# Patient Record
Sex: FEMALE | Race: WHITE | NOT HISPANIC OR LATINO | Employment: OTHER | URBAN - METROPOLITAN AREA
[De-identification: names, ages, dates, MRNs, and addresses within clinical notes are randomized per-mention and may not be internally consistent; named-entity substitution may affect disease eponyms.]

---

## 2017-12-12 ENCOUNTER — TRANSFERRED RECORDS (OUTPATIENT)
Dept: HEALTH INFORMATION MANAGEMENT | Facility: CLINIC | Age: 82
End: 2017-12-12

## 2018-04-06 ENCOUNTER — TRANSFERRED RECORDS (OUTPATIENT)
Dept: HEALTH INFORMATION MANAGEMENT | Facility: CLINIC | Age: 83
End: 2018-04-06

## 2018-04-10 ENCOUNTER — TRANSFERRED RECORDS (OUTPATIENT)
Dept: HEALTH INFORMATION MANAGEMENT | Facility: CLINIC | Age: 83
End: 2018-04-10

## 2018-07-30 ENCOUNTER — TRANSFERRED RECORDS (OUTPATIENT)
Dept: HEALTH INFORMATION MANAGEMENT | Facility: CLINIC | Age: 83
End: 2018-07-30

## 2018-09-13 ENCOUNTER — PRE VISIT (OUTPATIENT)
Dept: ORTHOPEDICS | Facility: CLINIC | Age: 83
End: 2018-09-13

## 2018-09-13 NOTE — TELEPHONE ENCOUNTER
RECORDS RECEIVED FROM: [facility]   DATE RECEIVED: 09/13/2018   NOTES STATUS DETAILS   OFFICE NOTE from referring provider Received    OFFICE NOTE from other specialist Received    DISCHARGE SUMMARY from hospital N/A    DISCHARGE REPORT from the ER N/A    OPERATIVE REPORT N/A    MEDICATION LIST In process    IMPLANT RECORD/STICKER N/A    LABS     CBC/DIFF In process    CULTURES N/A    INJECTIONS DONE IN RADIOLOGY In process    MRI Received    CT SCAN Received    XRAYS (IMAGES & REPORTS) Received    TUMOR     PATHOLOGY  Slides & report N/A

## 2018-11-26 ASSESSMENT — ENCOUNTER SYMPTOMS
NECK MASS: 0
JAUNDICE: 0
CONSTIPATION: 0
STIFFNESS: 0
SINUS PAIN: 0
POLYDIPSIA: 0
NAUSEA: 0
FATIGUE: 1
BLOOD IN STOOL: 0
HEADACHES: 0
ABDOMINAL PAIN: 0
NUMBNESS: 0
INCREASED ENERGY: 1
JOINT SWELLING: 0
TINGLING: 0
NECK PAIN: 0
HALLUCINATIONS: 0
WEAKNESS: 0
MYALGIAS: 0
RECTAL PAIN: 0
LOSS OF CONSCIOUSNESS: 0
SMELL DISTURBANCE: 0
TASTE DISTURBANCE: 0
DECREASED APPETITE: 1
BLOATING: 0
FEVER: 0
PARALYSIS: 0
SORE THROAT: 1
MEMORY LOSS: 0
TREMORS: 0
ARTHRALGIAS: 0
CHILLS: 0
MUSCLE WEAKNESS: 0
DISTURBANCES IN COORDINATION: 0
HOARSE VOICE: 0
BACK PAIN: 1
DIZZINESS: 0
BOWEL INCONTINENCE: 1
WEIGHT LOSS: 1
POLYPHAGIA: 0
NIGHT SWEATS: 0
SINUS CONGESTION: 0
SEIZURES: 0
WEIGHT GAIN: 0
SPEECH CHANGE: 0
MUSCLE CRAMPS: 0
TROUBLE SWALLOWING: 0
DIARRHEA: 1
HEARTBURN: 0
VOMITING: 0
ALTERED TEMPERATURE REGULATION: 0

## 2018-11-28 DIAGNOSIS — M54.50 LOW BACK PAIN: Primary | ICD-10-CM

## 2018-11-28 NOTE — PROGRESS NOTES
[facility]  Orthopedics  Johnathan Llamas MD  2018     Name: Avani Be  MRN: [id number]  Age: 86 year old  : 1931  Referring provider: Jorge Marcelo     Chief Complaint: Pain and Consult For of the Lower Back and Consult (possible loose screw and lumbar fx)     Date of Injury: 10/20/18    History of Present Illness:   Avani Be is a 86 year old female who presents to clinic today for an evaluation of low back pain.  She has a history of lumbar spinal fusion L4-5, which was performed in  by Dr. Sapp at [facility].  When she originally made this appointment, she was referred by Dr. Jorge Marcelo for a second opinion regarding a loose screw in her fusion construct.  However, about one month ago, she fell down one step and sustained an L2 compression fracture.  Her main complaint today is low back pain at this level.  Prior to the fall, her main complaint was an inability to stand for prolonged periods of time.  Specifically, she couldn't stand long enough to sing the hymns in [place].  She is currently taking Tylenol #3 for pain control.  She is able to sleep through the night.  She denies changes in bowel and bladder habits.  She denies taking any medications for osteoporosis.      She denies major medical problems including diabetes, heart disease, history of stroke, history of cancer, history of blood clot, kidney disease, and liver disease.  She is not taking any blood thinners other than a baby aspirin.    Review of Systems:   A 10-point review of systems was obtained and is negative except for as noted in the HPI.     Medications:     Current Outpatient Prescriptions:      acetaminophen (TYLENOL) 325 MG tablet, Take 2 tablets (650 mg) by mouth every 6 hours as needed for mild pain, Disp: 100 tablet, Rfl:      albuterol (2.5 MG/3ML) 0.083% nebulizer solution, Take 1 vial by nebulization every 6 hours as needed for shortness of breath / dyspnea or wheezing, Disp: , Rfl:       ALENDRONATE SODIUM PO, Take 35 mg by mouth once a week, Disp: , Rfl:      ASPIRIN PO, Take 81 mg by mouth daily, Disp: , Rfl:      Calcium Carbonate (CALTRATE 600 PO), , Disp: , Rfl:      fluticasone-salmeterol (ADVAIR) 500-50 MCG/DOSE diskus inhaler, Inhale 1 puff into the lungs 2 times daily, Disp: , Rfl:      GABAPENTIN PO, Take 300 mg by mouth 2 times daily, Disp: , Rfl:      METOPROLOL TARTRATE PO, Take 50 mg by mouth 2 times daily , Disp: , Rfl:      miconazole (MICATIN; MICRO GUARD) 2 % powder, Apply topically daily Use until March 5, Disp: , Rfl:      Multiple Vitamin (MULTIVITAMINS PO), Take 1 tablet by mouth Pt takes Centrum  silver, Disp: , Rfl:      omeprazole (PRILOSEC) 20 MG capsule, Take 20 mg by mouth daily, Disp: , Rfl:      senna-docusate (SENOKOT-S;PERICOLACE) 8.6-50 MG per tablet, Take 2 tablets by mouth 2 times daily as needed for constipation, Disp: 7 tablet, Rfl:      SIMVASTATIN PO, Take 40 mg by mouth At Bedtime, Disp: , Rfl:      traMADol (ULTRAM) 50 MG tablet, Take 0.5-1 tablets (25-50 mg) by mouth every 6 hours as needed for other (pain), Disp: 50 tablet, Rfl: 0     VITAMIN D, CHOLECALCIFEROL, PO, Take 2,000 Units by mouth daily, Disp: , Rfl:     Allergies:  Allergies   Allergen Reactions     Hydrocodone      Hydrocodon Bitartrate powder     Oxycodone Hcl [Oxycodone]        Past Medical History:  Past Medical History:   Diagnosis Date     Abdominal pain, unspecified site      Acquired keratoderma      Actinic keratosis      Acute sinusitis, unspecified      Acute upper respiratory infections of unspecified site      Arthritis      Benign neoplasm of colon      Benign neoplasm of skin, site unspecified      Cervicalgia      Closed dislocation of metatarsal (bone), joint unspecified      Cough      Diffuse cystic mastopathy      Dizziness and giddiness      Essential hypertension, benign      Fever, unspecified      GERD (gastroesophageal reflux disease)      Headache(784.0)       "Injury, other and unspecified, knee, leg, ankle, and foot      Mixed hyperlipidemia      Mononeuritis of unspecified site      Nonsuppurative otitis media, not specified as acute or chronic      Osteoporosis, unspecified      Other emphysema (H)      Other musculoskeletal symptoms referable to limbs(729.89)      Other seborrheic keratosis      Other specified personal history presenting hazards to health(V15.89)      Other specified personal history presenting hazards to health(V15.89)      Pain in limb      Plantar wart      Postnasal drip      Radiculopathy of lumbar region      Tachycardia, unspecified      Unspecified asthma(493.90)      Unspecified asthma(493.90)      Unspecified disorder of skin and subcutaneous tissue      Unspecified sinusitis (chronic)      Urinary tract infection, site not specified        Past Surgical History:  Past Surgical History:   Procedure Laterality Date     OPTICAL TRACKING SYSTEM FUSION POSTERIOR SPINE LUMBAR N/A 2/23/2015    Procedure: OPTICAL TRACKING SYSTEM FUSION SPINE POSTERIOR LUMBAR ONE LEVEL;  Surgeon: Issa Sapp MD;  Location:  OR     ORTHOPEDIC SURGERY Left     Left knee replacement   Cholecystectomy  Stomach stapling in 1980's     Social History:  Patient lives alone in an apartment. She denies tobacco use.    Social History     Social History     Marital status:      Spouse name: N/A     Number of children: N/A     Years of education: N/A     Social History Main Topics     Smoking status: Former Smoker     Smokeless tobacco: Not on file     Alcohol use Yes     Drug use: Not on file     Sexual activity: Not on file     Other Topics Concern     Not on file     Social History Narrative       Family History:  Non-contributory    Physical Examination:  Height 1.499 m (4' 11\"), weight 76.1 kg (167 lb 11.2 oz).   Constitutional - Patient is healthy, well-nourished and appears stated age.   Respiratory - Patient is breathing normally and in no respiratory " distress.   Skin - No suspicious rashes or lesions.   Psychiatric - Normal mood and affect.   Cardiovascular - Extremities warm and well perfused.   Eyes - Visual acuity is normal to the written word.   ENT - Hearing intact to the spoken word.   GI - No abdominal distention.   Musculoskeletal - Antalgic gait with the use of a walker.            Lumbar Spine:    Appearance - Stooped forward posture    Palpation - Tender to palpation in the midline    ROM - Deferred    Motor -        LOWER EXTREMITY Left Right   Hip flexion 5/5 5/5   Knee flexion 5/5 5/5   Knee extension 5/5 5/5   Ankle dorsiflexion 5/5 5/5   Ankle plantarflexion 5/5 5/5   Great toe extension 5/5 5/5      Neurologic - Sensation intact to light touch bilaterally in the lower extremities.      Imaging:   Radiographs of the full spine - two views (11/29/2018)  Instrumented levels include L4-5.  There is a compression deformity at L2.    A CT of the lumbar spine was reviewed today.  It shows halo formation around the right L5 pedicle screw.  There is gas present in the interspace of L4-L5.    I have independently reviewed the above imaging studies; the results were discussed with the patient.       Assessment:   1. H/O lumbar spinal fusion  2. Pseudarthrosis secondary to #1  3. L2 osteoporotic compression fracture    Plan:   Avani is an 86 year old female who presented to clinic today for an evaluation of low back pain.  She was originally referred for loose instrumentation around her previous lumbar spinal fusion, but her biggest complaint now is low back pain related to her compression fracture.  We discussed her imaging in detail, which does in fact show halo formatio around the right L5 screw, as well as a vacuum disc at L4-5.  We can address that problem in the future, if it significantly impacts her life.  However, right now we should address the pain associated with the compression fracture.  We discussed kyphoplasty in detail.  There is a time  frame in which to complete a kyphoplasty, which is ideally within six weeks of the injury but no more than three months.  We may be able to get her added onto the schedule for next week, but she will need a PAC evaluation with history and physical exam.      We reviewed the risks and benefits of the surgery in detail. The risks include those associated with anesthesia, including death, pulmonary embolism, DVT, stroke, myocardial infarction, pneumonia, blindness, and urinary tract infection. Additional risks include the risk of blood loss, infection, nerve damage, cement extravasation, and failure of the intervention to improve her symptoms.  She understands the risks of the surgery and wishes to proceed. All other questions were answered today.    Alondra Sr PA-C ........................ 11/29/2018, 2:34 PM    I saw and evaluated the patient and developed the plan.        Answers for HPI/ROS submitted by the patient on 11/26/2018   General Symptoms: Yes  Skin Symptoms: No  HENT Symptoms: Yes  EYE SYMPTOMS: No  HEART SYMPTOMS: No  LUNG SYMPTOMS: No  INTESTINAL SYMPTOMS: Yes  URINARY SYMPTOMS: No  GYNECOLOGIC SYMPTOMS: No  BREAST SYMPTOMS: No  SKELETAL SYMPTOMS: Yes  BLOOD SYMPTOMS: No  NERVOUS SYSTEM SYMPTOMS: Yes  MENTAL HEALTH SYMPTOMS: No  Fever: No  Loss of appetite: Yes  Weight loss: Yes  Weight gain: No  Fatigue: Yes  Night sweats: No  Chills: No  Increased stress: Yes  Excessive hunger: No  Excessive thirst: No  Feeling hot or cold when others believe the temperature is normal: No  Loss of height: No  Post-operative complications: No  Surgical site pain: No  Hallucinations: No  Change in or Loss of Energy: Yes  Hyperactivity: No  Confusion: No  Ear pain: No  Ear discharge: No  Hearing loss: Yes  Tinnitus: Yes  Nosebleeds: No  Congestion: No  Sinus pain: No  Trouble swallowing: No   Voice hoarseness: No  Mouth sores: No  Sore throat: Yes  Tooth pain: No  Gum tenderness: No  Bleeding gums: No  Change in  taste: No  Change in sense of smell: No  Dry mouth: No  Hearing aid used: No  Neck lump: No  Heart burn or indigestion: No  Nausea: No  Vomiting: No  Abdominal pain: No  Bloating: No  Constipation: No  Diarrhea: Yes  Blood in stool: No  Black stools: No  Rectal or Anal pain: No  Fecal incontinence: Yes  Yellowing of skin or eyes: No  Vomit with blood: No  Change in stools: No  Back pain: Yes  Muscle aches: No  Neck pain: No  Swollen joints: No  Joint pain: No  Bone pain: Yes  Muscle cramps: No  Muscle weakness: No  Joint stiffness: No  Bone fracture: Yes  Trouble with coordination: No  Dizziness or trouble with balance: No  Fainting or black-out spells: No  Memory loss: No  Headache: No  Seizures: No  Speech problems: No  Tingling: No  Tremor: No  Weakness: No  Difficulty walking: Yes  Paralysis: No  Numbness: No

## 2018-11-29 ENCOUNTER — RADIANT APPOINTMENT (OUTPATIENT)
Dept: GENERAL RADIOLOGY | Facility: CLINIC | Age: 83
End: 2018-11-29
Attending: ORTHOPAEDIC SURGERY
Payer: MEDICARE

## 2018-11-29 ENCOUNTER — OFFICE VISIT (OUTPATIENT)
Dept: ORTHOPEDICS | Facility: CLINIC | Age: 83
End: 2018-11-29
Payer: MEDICARE

## 2018-11-29 VITALS — WEIGHT: 167.7 LBS | BODY MASS INDEX: 33.81 KG/M2 | HEIGHT: 59 IN

## 2018-11-29 DIAGNOSIS — M80.88XD OSTEOPOROTIC COMPRESSION FRACTURE OF SPINE, WITH ROUTINE HEALING, SUBSEQUENT ENCOUNTER: Primary | ICD-10-CM

## 2018-11-29 DIAGNOSIS — M96.0 PSEUDARTHROSIS FOLLOWING SPINAL FUSION: ICD-10-CM

## 2018-11-29 DIAGNOSIS — M54.50 LOW BACK PAIN: ICD-10-CM

## 2018-11-29 DIAGNOSIS — Z98.1 H/O SPINAL FUSION: ICD-10-CM

## 2018-11-29 ASSESSMENT — PATIENT HEALTH QUESTIONNAIRE - PHQ9
SUM OF ALL RESPONSES TO PHQ QUESTIONS 1-9: 10
10. IF YOU CHECKED OFF ANY PROBLEMS, HOW DIFFICULT HAVE THESE PROBLEMS MADE IT FOR YOU TO DO YOUR WORK, TAKE CARE OF THINGS AT HOME, OR GET ALONG WITH OTHER PEOPLE: EXTREMELY DIFFICULT
SUM OF ALL RESPONSES TO PHQ QUESTIONS 1-9: 10

## 2018-11-29 NOTE — NURSING NOTE
"Reason For Visit:   Chief Complaint   Patient presents with     Lower Back - Pain, Consult For     Consult     possible loose screw and lumbar fx       Primary MD: Estevan Valdovinos  Ref. MD: Jorge Marcelo    ?  No  Work status?  Retired.  Date of injury: 10/20/18    Type of injury: Originally chronic condition but fell and fx lumbar vertebrae.  Date of surgery: 02/23/15  Type of surgery: REVISION L4-5 LAMINECTOMY; L4-5 POSTERIOR LUMBAR STEALTH INTERBODY FUSION ; AUTOGRAFT AND ALLOGRAFT  Smoker: No  Request smoking cessation information: No    Ht 1.499 m (4' 11\")  Wt 76.1 kg (167 lb 11.2 oz)  BMI 33.87 kg/m2    Pain Assessment  Patient Currently in Pain: Yes  0-10 Pain Scale: 8  Primary Pain Location: Back  Pain Orientation: Lower  Pain Descriptors: Shooting, Intermittent  Alleviating Factors: Pain medication, Other (comment) (tylenol 3 with codiene helps relax back)  Aggravating Factors: Sitting, Walking, Standing, Other (comment) (standing is the worst, riding in the car)    Oswestry (LOUIE) Questionnaire    OSWESTRY DISABILITY INDEX 11/26/2018   Count 9   Sum 30   Oswestry Score (%) 66.67   Some recent data might be hidden            Neck Disability Index (NDI) Questionnaire    No flowsheet data found.           Visual Analog Pain Scale  Back Pain Scale 0-10: 8.5  Right leg pain: 0  Left leg pain: 0  Neck Pain Scale 0-10: 0    Promis 10 Assessment    PROMIS 10 11/26/2018   In general, would you say your health is: Very good   In general, would you say your quality of life is: Very good   In general, how would you rate your physical health? Fair   In general, how would you rate your mental health, including your mood and your ability to think? Very good   In general, how would you rate your satisfaction with your social activities and relationships? Very good   In general, please rate how well you carry out your usual social activities and roles Fair   To what extent are you able to carry out your " everyday physical activities such as walking, climbing stairs, carrying groceries, or moving a chair? A little   How often have you been bothered by emotional problems such as feeling anxious, depressed or irritable? Rarely   How would you rate your fatigue on average? None   How would you rate your pain on average?   0 = No Pain  to  10 = Worst Imaginable Pain 8   In general, would you say your health is: 4   In general, would you say your quality of life is: 4   In general, how would you rate your physical health? 2   In general, how would you rate your mental health, including your mood and your ability to think? 4   In general, how would you rate your satisfaction with your social activities and relationships? 4   In general, please rate how well you carry out your usual social activities and roles. (This includes activities at home, at work and in your community, and responsibilities as a parent, child, spouse, employee, friend, etc.) 2   To what extent are you able to carry out your everyday physical activities such as walking, climbing stairs, carrying groceries, or moving a chair? 2   In the past 7 days, how often have you been bothered by emotional problems such as feeling anxious, depressed, or irritable? 2   In the past 7 days, how would you rate your fatigue on average? 1   In the past 7 days, how would you rate your pain on average, where 0 means no pain, and 10 means worst imaginable pain? 8   Global Mental Health Score 16   Global Physical Health Score 11   PROMIS TOTAL - SUBSCORES 27   Some recent data might be hidden                Stefanie Braswell, ATC

## 2018-11-29 NOTE — LETTER
2018       RE: Avani Be  [address]  [facility] [place] [facility] [place] [address]     Dear Colleague,    Thank you for referring your patient, Avani Be, to the [facility] at [facility]. Please see a copy of my visit note below.    [facility]  Orthopedics  Johnathan Llamas MD  2018     Name: Avani Be  MRN: [id number]  Age: 86 year old  : 1931  Referring provider: Jorge Marcelo     Chief Complaint: Pain and Consult For of the Lower Back and Consult (possible loose screw and lumbar fx)     Date of Injury: 10/20/18    History of Present Illness:   Avani Be is a 86 year old female who presents to clinic today for an evaluation of low back pain.  She has a history of lumbar spinal fusion L4-5, which was performed in  by Dr. Sapp at [facility].  When she originally made this appointment, she was referred by Dr. Jorge Marcelo for a second opinion regarding a loose screw in her fusion construct.  However, about one month ago, she fell down one step and sustained an L2 compression fracture.  Her main complaint today is low back pain at this level.  Prior to the fall, her main complaint was an inability to stand for prolonged periods of time.  Specifically, she couldn't stand long enough to sing the hymns in [place].  She is currently taking Tylenol #3 for pain control.  She is able to sleep through the night.  She denies changes in bowel and bladder habits.  She denies taking any medications for osteoporosis.      She denies major medical problems including diabetes, heart disease, history of stroke, history of cancer, history of blood clot, kidney disease, and liver disease.  She is not taking any blood thinners other than a baby aspirin.    Review of Systems:   A 10-point review of systems was obtained and is negative except for as noted in the HPI.     Medications:     Current Outpatient Prescriptions:       acetaminophen (TYLENOL) 325 MG tablet, Take 2 tablets (650 mg) by mouth every 6 hours as needed for mild pain, Disp: 100 tablet, Rfl:      albuterol (2.5 MG/3ML) 0.083% nebulizer solution, Take 1 vial by nebulization every 6 hours as needed for shortness of breath / dyspnea or wheezing, Disp: , Rfl:      ALENDRONATE SODIUM PO, Take 35 mg by mouth once a week, Disp: , Rfl:      ASPIRIN PO, Take 81 mg by mouth daily, Disp: , Rfl:      Calcium Carbonate (CALTRATE 600 PO), , Disp: , Rfl:      fluticasone-salmeterol (ADVAIR) 500-50 MCG/DOSE diskus inhaler, Inhale 1 puff into the lungs 2 times daily, Disp: , Rfl:      GABAPENTIN PO, Take 300 mg by mouth 2 times daily, Disp: , Rfl:      METOPROLOL TARTRATE PO, Take 50 mg by mouth 2 times daily , Disp: , Rfl:      miconazole (MICATIN; MICRO GUARD) 2 % powder, Apply topically daily Use until March 5, Disp: , Rfl:      Multiple Vitamin (MULTIVITAMINS PO), Take 1 tablet by mouth Pt takes Centrum  silver, Disp: , Rfl:      omeprazole (PRILOSEC) 20 MG capsule, Take 20 mg by mouth daily, Disp: , Rfl:      senna-docusate (SENOKOT-S;PERICOLACE) 8.6-50 MG per tablet, Take 2 tablets by mouth 2 times daily as needed for constipation, Disp: 7 tablet, Rfl:      SIMVASTATIN PO, Take 40 mg by mouth At Bedtime, Disp: , Rfl:      traMADol (ULTRAM) 50 MG tablet, Take 0.5-1 tablets (25-50 mg) by mouth every 6 hours as needed for other (pain), Disp: 50 tablet, Rfl: 0     VITAMIN D, CHOLECALCIFEROL, PO, Take 2,000 Units by mouth daily, Disp: , Rfl:     Allergies:  Allergies   Allergen Reactions     Hydrocodone      Hydrocodon Bitartrate powder     Oxycodone Hcl [Oxycodone]        Past Medical History:  Past Medical History:   Diagnosis Date     Abdominal pain, unspecified site      Acquired keratoderma      Actinic keratosis      Acute sinusitis, unspecified      Acute upper respiratory infections of unspecified site      Arthritis      Benign neoplasm of colon      Benign neoplasm of skin,  site unspecified      Cervicalgia      Closed dislocation of metatarsal (bone), joint unspecified      Cough      Diffuse cystic mastopathy      Dizziness and giddiness      Essential hypertension, benign      Fever, unspecified      GERD (gastroesophageal reflux disease)      Headache(784.0)      Injury, other and unspecified, knee, leg, ankle, and foot      Mixed hyperlipidemia      Mononeuritis of unspecified site      Nonsuppurative otitis media, not specified as acute or chronic      Osteoporosis, unspecified      Other emphysema (H)      Other musculoskeletal symptoms referable to limbs(729.89)      Other seborrheic keratosis      Other specified personal history presenting hazards to health(V15.89)      Other specified personal history presenting hazards to health(V15.89)      Pain in limb      Plantar wart      Postnasal drip      Radiculopathy of lumbar region      Tachycardia, unspecified      Unspecified asthma(493.90)      Unspecified asthma(493.90)      Unspecified disorder of skin and subcutaneous tissue      Unspecified sinusitis (chronic)      Urinary tract infection, site not specified        Past Surgical History:  Past Surgical History:   Procedure Laterality Date     OPTICAL TRACKING SYSTEM FUSION POSTERIOR SPINE LUMBAR N/A 2/23/2015    Procedure: OPTICAL TRACKING SYSTEM FUSION SPINE POSTERIOR LUMBAR ONE LEVEL;  Surgeon: Issa Sapp MD;  Location:  OR     ORTHOPEDIC SURGERY Left     Left knee replacement   Cholecystectomy  Stomach stapling in 1980's     Social History:  Patient lives alone in an apartment. She denies tobacco use.    Social History     Social History     Marital status:      Spouse name: N/A     Number of children: N/A     Years of education: N/A     Social History Main Topics     Smoking status: Former Smoker     Smokeless tobacco: Not on file     Alcohol use Yes     Drug use: Not on file     Sexual activity: Not on file     Other Topics Concern     Not on file  "    Social History Narrative       Family History:  Non-contributory    Physical Examination:  Height 1.499 m (4' 11\"), weight 76.1 kg (167 lb 11.2 oz).   Constitutional - Patient is healthy, well-nourished and appears stated age.   Respiratory - Patient is breathing normally and in no respiratory distress.   Skin - No suspicious rashes or lesions.   Psychiatric - Normal mood and affect.   Cardiovascular - Extremities warm and well perfused.   Eyes - Visual acuity is normal to the written word.   ENT - Hearing intact to the spoken word.   GI - No abdominal distention.   Musculoskeletal - Antalgic gait with the use of a walker.            Lumbar Spine:    Appearance - Stooped forward posture    Palpation - Tender to palpation in the midline    ROM - Deferred    Motor -        LOWER EXTREMITY Left Right   Hip flexion 5/5 5/5   Knee flexion 5/5 5/5   Knee extension 5/5 5/5   Ankle dorsiflexion 5/5 5/5   Ankle plantarflexion 5/5 5/5   Great toe extension 5/5 5/5      Neurologic - Sensation intact to light touch bilaterally in the lower extremities.      Imaging:   Radiographs of the full spine - two views (11/29/2018)  Instrumented levels include L4-5.  There is a compression deformity at L2.    A CT of the lumbar spine was reviewed today.  It shows halo formation around the right L5 pedicle screw.  There is gas present in the interspace of L4-L5.    I have independently reviewed the above imaging studies; the results were discussed with the patient.       Assessment:   1. H/O lumbar spinal fusion  2. Pseudarthrosis secondary to #1  3. L2 osteoporotic compression fracture    Plan:   Avani is an 86 year old female who presented to clinic today for an evaluation of low back pain.  She was originally referred for loose instrumentation around her previous lumbar spinal fusion, but her biggest complaint now is low back pain related to her compression fracture.  We discussed her imaging in detail, which does in fact show halo " formatio around the right L5 screw, as well as a vacuum disc at L4-5.  We can address that problem in the future, if it significantly impacts her life.  However, right now we should address the pain associated with the compression fracture.  We discussed kyphoplasty in detail.  There is a time frame in which to complete a kyphoplasty, which is ideally within six weeks of the injury but no more than three months.  We may be able to get her added onto the schedule for next week, but she will need a PAC evaluation with history and physical exam.      We reviewed the risks and benefits of the surgery in detail. The risks include those associated with anesthesia, including death, pulmonary embolism, DVT, stroke, myocardial infarction, pneumonia, blindness, and urinary tract infection. Additional risks include the risk of blood loss, infection, nerve damage, cement extravasation, and failure of the intervention to improve her symptoms.  She understands the risks of the surgery and wishes to proceed. All other questions were answered today.    Alondra Sr PA-C ........................ 11/29/2018, 2:34 PM    I saw and evaluated the patient and developed the plan.    Again, thank you for allowing me to participate in the care of your patient.      Sincerely,    Johnathan Llamas MD

## 2018-11-29 NOTE — MR AVS SNAPSHOT
After Visit Summary   11/29/2018    Avani Be    MRN: [id number]           Patient Information     Date Of Birth          12/30/1931        Visit Information        Provider Department      11/29/2018 1:00 PM Johnathan Llamas MD [facility]        Today's Diagnoses     Osteoporotic compression fracture of spine, with routine healing, subsequent encounter    -  1    H/O spinal fusion        Pseudarthrosis following spinal fusion           Follow-ups after your visit        Additional Services     SPORTS MEDICINE REFERRAL       Your provider has referred you to:  Dr. Mccauley for bone density evaluation    Please be aware that coverage of these services is subject to the terms and limitations of your health insurance plan.  Call member services at your health plan with any benefit or coverage questions.      Please bring the following to your appointment:    >>   Any x-rays, CTs or MRIs which have been performed.  Contact the facility where they were done to arrange for  prior to your scheduled appointment.    >>   List of current medications   >>   This referral request   >>   Any documents/labs given to you for this referral                  Your next 10 appointments already scheduled     Dec 13, 2018  4:00 PM CST   (Arrive by 3:45 PM)   New Patient Visit with Bob Peraza MD   [facility] ([facility])    [address] [place] [address]  5th [facility] [address]   [phone]            Jan 23, 2019  9:30 AM CST   (Arrive by 9:00 AM)   RETURN SPINE with Johnathan Llamas MD   [facility] ([facility])    [address] [place] [address]   [phone]            Feb 27, 2019 10:45 AM CST   (Arrive by 10:15 AM)   RETURN SPINE with Johnathan Llamas MD   [facility] ([facility])    [address] [place] [address]  "[id number] [phone]              Who to contact     Please call your clinic at [phone] to:    Ask questions about your health    Make or cancel appointments    Discuss your medicines    Learn about your test results    Speak to your doctor            Additional Information About Your Visit        madKasthart Information     [employer] gives you secure access to your electronic health record. If you see a primary care provider, you can also send messages to your care team and make appointments. If you have questions, please call your primary care clinic.  If you do not have a primary care provider, please call [phone] and they will assist you.      [employer] is an electronic gateway that provides easy, online access to your medical records. With [employer], you can request a clinic appointment, read your test results, renew a prescription or communicate with your care team.     To access your existing account, please contact your [facility] or call [phone] for assistance.        Care EveryWhere ID     This is your Care EveryWhere ID. This could be used by other organizations to access your [facility] medical records  [id number]        Your Vitals Were     Height BMI (Body Mass Index)                1.499 m (4' 11\") 33.87 kg/m2           Blood Pressure from Last 3 Encounters:   02/26/15 128/63    Weight from Last 3 Encounters:   11/29/18 76.1 kg (167 lb 11.2 oz)   02/23/15 81 kg (178 lb 9.2 oz)              We Performed the Following     SPORTS MEDICINE REFERRAL        Primary Care Provider Office Phone # Fax #    Estevan Valdovinos [phone]       [facility] [address]  [facility] [address]        Equal Access to Services     [facility]: Hadii aad ku hadasho Somihaela, waaxda luqadaha, qaybta kaalkay martin. So [facility] [phone].    ATENCIÓN: Si habla español, tiene a ng disposición " servicios gratuitos de asistencia lingüística. Mel bowie [phone].    We comply with applicable federal civil rights laws and [place] laws. We do not discriminate on the basis of race, color, national origin, age, disability, sex, sexual orientation, or gender identity.            Thank you!     Thank you for choosing [facility]  for your care. Our goal is always to provide you with excellent care. Hearing back from our patients is one way we can continue to improve our services. Please take a few minutes to complete the written survey that you may receive in the mail after your visit with us. Thank you!             Your Updated Medication List - Protect others around you: Learn how to safely use, store and throw away your medicines at www.disposemymeds.org.          This list is accurate as of 11/29/18  2:43 PM.  Always use your most recent med list.                   Brand Name Dispense Instructions for use Diagnosis    acetaminophen 325 MG tablet    TYLENOL    100 tablet    Take 2 tablets (650 mg) by mouth every 6 hours as needed for mild pain    S/P lumbar spinal fusion       albuterol (2.5 MG/3ML) 0.083% neb solution    PROVENTIL     Take 1 vial by nebulization every 6 hours as needed for shortness of breath / dyspnea or wheezing        ALENDRONATE SODIUM PO      Take 35 mg by mouth once a week        ASPIRIN PO      Take 81 mg by mouth daily        CALTRATE 600 PO           fluticasone-salmeterol 500-50 MCG/DOSE inhaler    ADVAIR     Inhale 1 puff into the lungs 2 times daily        GABAPENTIN PO      Take 300 mg by mouth 2 times daily        METOPROLOL TARTRATE PO      Take 50 mg by mouth 2 times daily        miconazole 2 % external powder    MICATIN/MICRO GUARD     Apply topically daily Use until March 5    S/P lumbar spinal fusion       MULTIVITAMINS PO      Take 1 tablet by mouth Pt takes Centrum  silver        omeprazole 20 MG DR capsule    priLOSEC     Take 20 mg by mouth daily         senna-docusate 8.6-50 MG tablet    SENOKOT-S/PERICOLACE    7 tablet    Take 2 tablets by mouth 2 times daily as needed for constipation    S/P lumbar spinal fusion       SIMVASTATIN PO      Take 40 mg by mouth At Bedtime        traMADol 50 MG tablet    ULTRAM    50 tablet    Take 0.5-1 tablets (25-50 mg) by mouth every 6 hours as needed for other (pain)    S/P lumbar spinal fusion       VITAMIN D (CHOLECALCIFEROL) PO      Take 2,000 Units by mouth daily

## 2018-11-29 NOTE — NURSING NOTE
Teaching Flowsheet   Relevant Diagnosis: L2 FX  Teaching Topic: preop      Person(s) involved in teaching:   Patient, Daughter and son in law     Motivation Level:  Asks Questions: Yes  Eager to Learn: Yes  Cooperative: Yes  Receptive (willing/able to accept information): Yes  Any cultural factors/Protestant beliefs that may influence understanding or compliance? No       Patient and Family demonstrates understanding of the following:  Reason for the appointment, diagnosis and treatment plan: Yes  Knowledge of proper use of medications and conditions for which they are ordered (with special attention to potential side effects or drug interactions): Yes  Which situations necessitate calling provider and whom to contact: Yes       Teaching Concerns Addressed:        Proper use and care of meds. (medical equip, care aids, etc.): Yes  Nutritional needs and diet plan: Yes  Pain management techniques: Yes  Wound Care: Yes  How and/when to access community resources: Yes     Instructional Materials Used/Given: preop pkt     Time spent with patient: 15 minutes.

## 2018-11-30 ASSESSMENT — PATIENT HEALTH QUESTIONNAIRE - PHQ9: SUM OF ALL RESPONSES TO PHQ QUESTIONS 1-9: 10

## 2018-12-02 ENCOUNTER — ANESTHESIA EVENT (OUTPATIENT)
Dept: SURGERY | Facility: CLINIC | Age: 83
End: 2018-12-02
Payer: MEDICARE

## 2018-12-02 ASSESSMENT — ASTHMA QUESTIONNAIRES: QUESTION_5 LAST FOUR WEEKS HOW WOULD YOU RATE YOUR ASTHMA CONTROL: WELL CONTROLLED

## 2018-12-03 ENCOUNTER — APPOINTMENT (OUTPATIENT)
Dept: GENERAL RADIOLOGY | Facility: CLINIC | Age: 83
End: 2018-12-03
Attending: ORTHOPAEDIC SURGERY
Payer: MEDICARE

## 2018-12-03 ENCOUNTER — SURGERY (OUTPATIENT)
Age: 83
End: 2018-12-03

## 2018-12-03 ENCOUNTER — ANESTHESIA (OUTPATIENT)
Dept: SURGERY | Facility: CLINIC | Age: 83
End: 2018-12-03
Payer: MEDICARE

## 2018-12-03 ENCOUNTER — HOSPITAL ENCOUNTER (OUTPATIENT)
Facility: CLINIC | Age: 83
Discharge: HOME | End: 2018-12-03
Attending: ORTHOPAEDIC SURGERY | Admitting: ORTHOPAEDIC SURGERY
Payer: MEDICARE

## 2018-12-03 VITALS
OXYGEN SATURATION: 98 % | HEART RATE: 94 BPM | HEIGHT: 59 IN | SYSTOLIC BLOOD PRESSURE: 95 MMHG | DIASTOLIC BLOOD PRESSURE: 57 MMHG | RESPIRATION RATE: 16 BRPM | TEMPERATURE: 97.7 F | WEIGHT: 163.14 LBS | BODY MASS INDEX: 32.89 KG/M2

## 2018-12-03 DIAGNOSIS — Z98.890 S/P KYPHOPLASTY: Primary | ICD-10-CM

## 2018-12-03 LAB — GLUCOSE BLDC GLUCOMTR-MCNC: 116 MG/DL (ref 70–99)

## 2018-12-03 PROCEDURE — 40000278 XR SURGERY CARM FLUORO LESS THAN 5 MIN: Mod: TC

## 2018-12-03 PROCEDURE — 25000125 ZZHC RX 250: Performed by: NURSE ANESTHETIST, CERTIFIED REGISTERED

## 2018-12-03 PROCEDURE — 25000128 H RX IP 250 OP 636: Performed by: ANESTHESIOLOGY

## 2018-12-03 PROCEDURE — 37000009 ZZH ANESTHESIA TECHNICAL FEE, EACH ADDTL 15 MIN: Performed by: ORTHOPAEDIC SURGERY

## 2018-12-03 PROCEDURE — 37000008 ZZH ANESTHESIA TECHNICAL FEE, 1ST 30 MIN: Performed by: ORTHOPAEDIC SURGERY

## 2018-12-03 PROCEDURE — 25000132 ZZH RX MED GY IP 250 OP 250 PS 637: Mod: GY | Performed by: ANESTHESIOLOGY

## 2018-12-03 PROCEDURE — 25000566 ZZH SEVOFLURANE, EA 15 MIN: Performed by: ORTHOPAEDIC SURGERY

## 2018-12-03 PROCEDURE — A9270 NON-COVERED ITEM OR SERVICE: HCPCS | Mod: GY | Performed by: ANESTHESIOLOGY

## 2018-12-03 PROCEDURE — 71000014 ZZH RECOVERY PHASE 1 LEVEL 2 FIRST HR: Performed by: ORTHOPAEDIC SURGERY

## 2018-12-03 PROCEDURE — 82962 GLUCOSE BLOOD TEST: CPT

## 2018-12-03 PROCEDURE — 36000076 ZZH SURGERY LEVEL 6 EA 15 ADDTL MIN - UMMC: Performed by: ORTHOPAEDIC SURGERY

## 2018-12-03 PROCEDURE — 25500064 ZZH RX 255 OP 636: Performed by: ORTHOPAEDIC SURGERY

## 2018-12-03 PROCEDURE — 25000128 H RX IP 250 OP 636: Performed by: PHYSICIAN ASSISTANT

## 2018-12-03 PROCEDURE — 25000128 H RX IP 250 OP 636: Performed by: NURSE ANESTHETIST, CERTIFIED REGISTERED

## 2018-12-03 PROCEDURE — 36000074 ZZH SURGERY LEVEL 6 1ST 30 MIN - UMMC: Performed by: ORTHOPAEDIC SURGERY

## 2018-12-03 PROCEDURE — C9290 INJ, BUPIVACAINE LIPOSOME: HCPCS | Performed by: PHYSICIAN ASSISTANT

## 2018-12-03 PROCEDURE — 27810169 ZZH OR IMPLANT GENERAL: Performed by: ORTHOPAEDIC SURGERY

## 2018-12-03 PROCEDURE — 40000170 ZZH STATISTIC PRE-PROCEDURE ASSESSMENT II: Performed by: ORTHOPAEDIC SURGERY

## 2018-12-03 PROCEDURE — 25000125 ZZHC RX 250: Performed by: ORTHOPAEDIC SURGERY

## 2018-12-03 PROCEDURE — 71000015 ZZH RECOVERY PHASE 1 LEVEL 2 EA ADDTL HR: Performed by: ORTHOPAEDIC SURGERY

## 2018-12-03 PROCEDURE — A9270 NON-COVERED ITEM OR SERVICE: HCPCS | Mod: GY | Performed by: PHYSICIAN ASSISTANT

## 2018-12-03 PROCEDURE — 71000027 ZZH RECOVERY PHASE 2 EACH 15 MINS: Performed by: ORTHOPAEDIC SURGERY

## 2018-12-03 PROCEDURE — 25000132 ZZH RX MED GY IP 250 OP 250 PS 637: Mod: GY | Performed by: PHYSICIAN ASSISTANT

## 2018-12-03 PROCEDURE — 27210794 ZZH OR GENERAL SUPPLY STERILE: Performed by: ORTHOPAEDIC SURGERY

## 2018-12-03 DEVICE — BONE CEMENT KYPHX HV-R C01A: Type: IMPLANTABLE DEVICE | Site: BACK | Status: FUNCTIONAL

## 2018-12-03 RX ORDER — OXYCODONE HYDROCHLORIDE 5 MG/1
5 TABLET ORAL
Status: DISCONTINUED | OUTPATIENT
Start: 2018-12-03 | End: 2018-12-03 | Stop reason: HOSPADM

## 2018-12-03 RX ORDER — ONDANSETRON 2 MG/ML
INJECTION INTRAMUSCULAR; INTRAVENOUS PRN
Status: DISCONTINUED | OUTPATIENT
Start: 2018-12-03 | End: 2018-12-03

## 2018-12-03 RX ORDER — FENTANYL CITRATE 50 UG/ML
25-50 INJECTION, SOLUTION INTRAMUSCULAR; INTRAVENOUS EVERY 5 MIN PRN
Status: DISCONTINUED | OUTPATIENT
Start: 2018-12-03 | End: 2018-12-03 | Stop reason: HOSPADM

## 2018-12-03 RX ORDER — HYDROXYZINE HYDROCHLORIDE 10 MG/1
10 TABLET, FILM COATED ORAL
Status: DISCONTINUED | OUTPATIENT
Start: 2018-12-03 | End: 2018-12-03 | Stop reason: HOSPADM

## 2018-12-03 RX ORDER — ESMOLOL HYDROCHLORIDE 10 MG/ML
INJECTION INTRAVENOUS PRN
Status: DISCONTINUED | OUTPATIENT
Start: 2018-12-03 | End: 2018-12-03

## 2018-12-03 RX ORDER — ACETAMINOPHEN 325 MG/1
650 TABLET ORAL
Status: DISCONTINUED | OUTPATIENT
Start: 2018-12-03 | End: 2018-12-03 | Stop reason: HOSPADM

## 2018-12-03 RX ORDER — MEPERIDINE HYDROCHLORIDE 25 MG/ML
12.5 INJECTION INTRAMUSCULAR; INTRAVENOUS; SUBCUTANEOUS
Status: DISCONTINUED | OUTPATIENT
Start: 2018-12-03 | End: 2018-12-03 | Stop reason: HOSPADM

## 2018-12-03 RX ORDER — HYDRALAZINE HYDROCHLORIDE 20 MG/ML
2.5 INJECTION INTRAMUSCULAR; INTRAVENOUS
Status: DISCONTINUED | OUTPATIENT
Start: 2018-12-03 | End: 2018-12-03 | Stop reason: HOSPADM

## 2018-12-03 RX ORDER — ONDANSETRON 4 MG/1
4 TABLET, ORALLY DISINTEGRATING ORAL
Status: DISCONTINUED | OUTPATIENT
Start: 2018-12-03 | End: 2018-12-03 | Stop reason: HOSPADM

## 2018-12-03 RX ORDER — GABAPENTIN 100 MG/1
300 CAPSULE ORAL ONCE
Status: COMPLETED | OUTPATIENT
Start: 2018-12-03 | End: 2018-12-03

## 2018-12-03 RX ORDER — CEFAZOLIN SODIUM 1 G/3ML
1 INJECTION, POWDER, FOR SOLUTION INTRAMUSCULAR; INTRAVENOUS SEE ADMIN INSTRUCTIONS
Status: DISCONTINUED | OUTPATIENT
Start: 2018-12-03 | End: 2018-12-03 | Stop reason: HOSPADM

## 2018-12-03 RX ORDER — SODIUM CHLORIDE, SODIUM LACTATE, POTASSIUM CHLORIDE, CALCIUM CHLORIDE 600; 310; 30; 20 MG/100ML; MG/100ML; MG/100ML; MG/100ML
INJECTION, SOLUTION INTRAVENOUS CONTINUOUS
Status: DISCONTINUED | OUTPATIENT
Start: 2018-12-03 | End: 2018-12-03 | Stop reason: HOSPADM

## 2018-12-03 RX ORDER — AMOXICILLIN 250 MG
1-2 CAPSULE ORAL 2 TIMES DAILY PRN
Qty: 30 TABLET | Refills: 0 | Status: SHIPPED | OUTPATIENT
Start: 2018-12-03

## 2018-12-03 RX ORDER — OXYCODONE HYDROCHLORIDE 5 MG/1
5 TABLET ORAL EVERY 6 HOURS PRN
Qty: 40 TABLET | Refills: 0 | Status: SHIPPED | OUTPATIENT
Start: 2018-12-03

## 2018-12-03 RX ORDER — ONDANSETRON 2 MG/ML
4 INJECTION INTRAMUSCULAR; INTRAVENOUS EVERY 30 MIN PRN
Status: DISCONTINUED | OUTPATIENT
Start: 2018-12-03 | End: 2018-12-03 | Stop reason: HOSPADM

## 2018-12-03 RX ORDER — ONDANSETRON 4 MG/1
4 TABLET, ORALLY DISINTEGRATING ORAL EVERY 30 MIN PRN
Status: DISCONTINUED | OUTPATIENT
Start: 2018-12-03 | End: 2018-12-03 | Stop reason: HOSPADM

## 2018-12-03 RX ORDER — ACETAMINOPHEN 325 MG/1
325 TABLET ORAL EVERY 6 HOURS PRN
Qty: 50 TABLET | Refills: 0 | Status: SHIPPED | OUTPATIENT
Start: 2018-12-03

## 2018-12-03 RX ORDER — METOPROLOL TARTRATE 50 MG
50 TABLET ORAL ONCE
Status: COMPLETED | OUTPATIENT
Start: 2018-12-03 | End: 2018-12-03

## 2018-12-03 RX ORDER — FENTANYL CITRATE 50 UG/ML
INJECTION, SOLUTION INTRAMUSCULAR; INTRAVENOUS PRN
Status: DISCONTINUED | OUTPATIENT
Start: 2018-12-03 | End: 2018-12-03

## 2018-12-03 RX ORDER — LIDOCAINE 50 MG/G
1 PATCH TOPICAL EVERY 24 HOURS
COMMUNITY

## 2018-12-03 RX ORDER — SODIUM CHLORIDE, SODIUM LACTATE, POTASSIUM CHLORIDE, CALCIUM CHLORIDE 600; 310; 30; 20 MG/100ML; MG/100ML; MG/100ML; MG/100ML
INJECTION, SOLUTION INTRAVENOUS CONTINUOUS PRN
Status: DISCONTINUED | OUTPATIENT
Start: 2018-12-03 | End: 2018-12-03

## 2018-12-03 RX ORDER — PROPOFOL 10 MG/ML
INJECTION, EMULSION INTRAVENOUS PRN
Status: DISCONTINUED | OUTPATIENT
Start: 2018-12-03 | End: 2018-12-03

## 2018-12-03 RX ORDER — CEFAZOLIN SODIUM 2 G/100ML
2 INJECTION, SOLUTION INTRAVENOUS
Status: COMPLETED | OUTPATIENT
Start: 2018-12-03 | End: 2018-12-03

## 2018-12-03 RX ORDER — BUPIVACAINE HYDROCHLORIDE AND EPINEPHRINE 2.5; 5 MG/ML; UG/ML
INJECTION, SOLUTION INFILTRATION; PERINEURAL PRN
Status: DISCONTINUED | OUTPATIENT
Start: 2018-12-03 | End: 2018-12-03 | Stop reason: HOSPADM

## 2018-12-03 RX ORDER — NALOXONE HYDROCHLORIDE 0.4 MG/ML
.1-.4 INJECTION, SOLUTION INTRAMUSCULAR; INTRAVENOUS; SUBCUTANEOUS
Status: DISCONTINUED | OUTPATIENT
Start: 2018-12-03 | End: 2018-12-03 | Stop reason: HOSPADM

## 2018-12-03 RX ORDER — CITRIC ACID/SODIUM CITRATE 334-500MG
30 SOLUTION, ORAL ORAL ONCE
Status: COMPLETED | OUTPATIENT
Start: 2018-12-03 | End: 2018-12-03

## 2018-12-03 RX ADMIN — FENTANYL CITRATE 25 MCG: 50 INJECTION, SOLUTION INTRAMUSCULAR; INTRAVENOUS at 10:49

## 2018-12-03 RX ADMIN — BUPIVACAINE HYDROCHLORIDE AND EPINEPHRINE BITARTRATE 50 ML: 2.5; .005 INJECTION, SOLUTION INFILTRATION; PERINEURAL at 10:20

## 2018-12-03 RX ADMIN — PROPOFOL 20 MG: 10 INJECTION, EMULSION INTRAVENOUS at 09:50

## 2018-12-03 RX ADMIN — ESMOLOL HYDROCHLORIDE 10 MG: 10 INJECTION, SOLUTION INTRAVENOUS at 11:17

## 2018-12-03 RX ADMIN — CEFAZOLIN SODIUM 2 G: 2 INJECTION, SOLUTION INTRAVENOUS at 10:05

## 2018-12-03 RX ADMIN — ONDANSETRON 4 MG: 2 INJECTION INTRAMUSCULAR; INTRAVENOUS at 10:23

## 2018-12-03 RX ADMIN — FENTANYL CITRATE 25 MCG: 50 INJECTION, SOLUTION INTRAMUSCULAR; INTRAVENOUS at 09:48

## 2018-12-03 RX ADMIN — FENTANYL CITRATE 25 MCG: 50 INJECTION, SOLUTION INTRAMUSCULAR; INTRAVENOUS at 11:07

## 2018-12-03 RX ADMIN — ESMOLOL HYDROCHLORIDE 5 MG: 10 INJECTION, SOLUTION INTRAVENOUS at 11:05

## 2018-12-03 RX ADMIN — FENTANYL CITRATE 25 MCG: 50 INJECTION, SOLUTION INTRAMUSCULAR; INTRAVENOUS at 11:02

## 2018-12-03 RX ADMIN — SODIUM CITRATE AND CITRIC ACID MONOHYDRATE 30 ML: 500; 334 SOLUTION ORAL at 08:06

## 2018-12-03 RX ADMIN — METOPROLOL TARTRATE 50 MG: 50 TABLET ORAL at 08:06

## 2018-12-03 RX ADMIN — ESMOLOL HYDROCHLORIDE 5 MG: 10 INJECTION, SOLUTION INTRAVENOUS at 10:58

## 2018-12-03 RX ADMIN — HYDRALAZINE HYDROCHLORIDE 2.5 MG: 20 INJECTION INTRAMUSCULAR; INTRAVENOUS at 11:30

## 2018-12-03 RX ADMIN — BUPIVACAINE 20 ML: 13.3 INJECTION, SUSPENSION, LIPOSOMAL INFILTRATION at 10:47

## 2018-12-03 RX ADMIN — SUGAMMADEX 200 MG: 100 INJECTION, SOLUTION INTRAVENOUS at 10:56

## 2018-12-03 RX ADMIN — PROPOFOL 20 MG: 10 INJECTION, EMULSION INTRAVENOUS at 10:26

## 2018-12-03 RX ADMIN — ACETAMINOPHEN AND CODEINE PHOSPHATE 1 TABLET: 300; 30 TABLET ORAL at 12:32

## 2018-12-03 RX ADMIN — FENTANYL CITRATE 25 MCG: 50 INJECTION, SOLUTION INTRAMUSCULAR; INTRAVENOUS at 10:13

## 2018-12-03 RX ADMIN — ESMOLOL HYDROCHLORIDE 5 MG: 10 INJECTION, SOLUTION INTRAVENOUS at 11:02

## 2018-12-03 RX ADMIN — PROPOFOL 10 MG: 10 INJECTION, EMULSION INTRAVENOUS at 10:50

## 2018-12-03 RX ADMIN — FENTANYL CITRATE 50 MCG: 50 INJECTION, SOLUTION INTRAMUSCULAR; INTRAVENOUS at 11:38

## 2018-12-03 RX ADMIN — PROPOFOL 20 MG: 10 INJECTION, EMULSION INTRAVENOUS at 09:52

## 2018-12-03 RX ADMIN — FENTANYL CITRATE 25 MCG: 50 INJECTION, SOLUTION INTRAMUSCULAR; INTRAVENOUS at 09:51

## 2018-12-03 RX ADMIN — ROCURONIUM BROMIDE 35 MG: 10 INJECTION INTRAVENOUS at 09:51

## 2018-12-03 RX ADMIN — PROPOFOL 20 MG: 10 INJECTION, EMULSION INTRAVENOUS at 09:51

## 2018-12-03 RX ADMIN — SODIUM CHLORIDE, POTASSIUM CHLORIDE, SODIUM LACTATE AND CALCIUM CHLORIDE: 600; 310; 30; 20 INJECTION, SOLUTION INTRAVENOUS at 09:42

## 2018-12-03 RX ADMIN — FENTANYL CITRATE 25 MCG: 50 INJECTION, SOLUTION INTRAMUSCULAR; INTRAVENOUS at 10:26

## 2018-12-03 RX ADMIN — IOTHALAMATE MEGLUMINE 4 ML: 600 INJECTION INTRAVASCULAR at 11:04

## 2018-12-03 RX ADMIN — FENTANYL CITRATE 25 MCG: 50 INJECTION, SOLUTION INTRAMUSCULAR; INTRAVENOUS at 09:49

## 2018-12-03 RX ADMIN — GABAPENTIN 300 MG: 300 CAPSULE ORAL at 08:06

## 2018-12-03 ASSESSMENT — ENCOUNTER SYMPTOMS
DYSRHYTHMIAS: 0
APNEA: 0

## 2018-12-03 NOTE — OP NOTE
Procedure Date: 12/03/2018      PREOPERATIVE DIAGNOSIS:  L2 osteoporotic compression fracture x6 weeks.      POSTOPERATIVE DIAGNOSIS:  L2 osteoporotic compression fracture x6 weeks.      PROCEDURE:  L2 kyphoplasty.      SURGEON:  Johnathan Llamas Jr., MD      ASSISTANT:  JOSÉ Broussard was necessary for assistance with placement of the cannulas and devices along with closure.  No qualified resident was available.      INDICATION FOR OPERATION:  The patient is an adult female who presented for evaluation of her prior L4-L5 fusion but had had 6 weeks of significant new-onset pain when she fell down one step and sustained an L2 compression fracture and her primary complaint at the time of presentation was of pain in this area.  Imaging was obtained which showed a significant new L2 compression fracture and discussion had with the patient about the risks, benefits, alternative treatments and expected outcomes.  Given her age, her disability associated with this. My recommendation was for kyphoplasty.  Family members were with her and all were in agreement that this was a reasonable thing to do.      DESCRIPTION OF TECHNICAL PROCEDURES USED:  The OR team was briefed about the plan.  The patient was brought to the operating room, underwent the induction of adequate general anesthesia, was turned in position prone on a 4 post frame.  She    was prepared and draped in the usual sterile fashion.  A timeout was done confirming the site and type of surgery.  She did receive prophylactic antibiotics.      A percutaneous reference pin was placed in the right posterior iliac spine.  Intraoperative 3D images obtained and transferred to the Stealth image-guided workstation.  This was now used to perform identification of the entry points and then navigated placement of trocars into the pedicles.  The navigation was off a little bit and so I had to confirm most of the work under fluoroscopy.      Once the trocars were delivered  in what appeared to be an optimal fashion into the vertebral bodies, the drill was then used to create a tract and then the balloons inserted and inflated to 4 mL each.  We did see elevation of the superior endplate of L2.  The balloons were removed.  Some contrast extravasated from one of the balloons was present on x-ray.  This was a minimal amount.  Next, cement was progressively filled watching this under fluoroscopy and a total of 3 mL on each side for volume of 6 mL of cement was used to fill the vertebral body.  2D and 3D images were obtained.  This showed the cement in the body in an optimal position.  There was a tract on the right side that showed 1 mm of extravasation of the cement along the course of the pedicle probably from an errant pass of the initial trocar.  We will observe this and see if it is symptomatic.      The wound was then closed with interrupted absorbable sutures followed by Dermabond.  Estimated blood loss was less than 5 mL.         RODOLFO SY JR, MD             D: 2018   T: 2018   MT: LIBBY      Name:     JOE FIGUEROA   MRN:      [id number]        Account:        [id number]   :      1931           Procedure Date: 2018      Document: [id number]

## 2018-12-03 NOTE — ANESTHESIA PREPROCEDURE EVALUATION
Anesthesia Pre-Procedure Evaluation    Patient: Avani Be   MRN:     [id number] Gender:   female   Age:    86 year old :      1931        Preoperative Diagnosis: Lumbar 2 Compression Fracture    Procedure(s):  Stealth Assisted Lumbar 2 Compression Fracture-Lumbar 2 Kyphoplasty      Past Medical History:   Diagnosis Date     Abdominal pain, unspecified site      Acquired keratoderma      Actinic keratosis      Acute sinusitis, unspecified      Acute upper respiratory infections of unspecified site      Arthritis      Benign neoplasm of colon      Benign neoplasm of skin, site unspecified      Cervicalgia      Closed dislocation of metatarsal (bone), joint unspecified      Cough      Diffuse cystic mastopathy      Dizziness and giddiness      Essential hypertension, benign      Fever, unspecified      GERD (gastroesophageal reflux disease)      Headache(784.0)      Injury, other and unspecified, knee, leg, ankle, and foot      Mixed hyperlipidemia      Mononeuritis of unspecified site      Nonsuppurative otitis media, not specified as acute or chronic      Osteoporosis, unspecified      Other emphysema (H)      Other musculoskeletal symptoms referable to limbs(729.89)      Other seborrheic keratosis      Other specified personal history presenting hazards to health(V15.89)      Other specified personal history presenting hazards to health(V15.89)      Pain in limb      Plantar wart      Postnasal drip      Radiculopathy of lumbar region      Tachycardia, unspecified      Unspecified asthma(493.90)      Unspecified asthma(493.90)      Unspecified disorder of skin and subcutaneous tissue      Unspecified sinusitis (chronic)      Urinary tract infection, site not specified       Past Surgical History:   Procedure Laterality Date     OPTICAL TRACKING SYSTEM FUSION POSTERIOR SPINE LUMBAR N/A 2015    Procedure: OPTICAL TRACKING SYSTEM FUSION SPINE POSTERIOR LUMBAR ONE LEVEL;  Surgeon: Issa Sapp MD;   Location: [facility] OR     ORTHOPEDIC SURGERY Left     Left knee replacement          Anesthesia Evaluation    ROS/Med Hx    No history of anesthetic complications  (-) malignant hyperthermia and tuberculosis  Comments: Met with Avani who has been NPO and is scheduled for: Procedure(s):  Stealth Assisted Lumbar 2 Compression Fracture-Lumbar 2 Kyphoplasty     She says she did well with GA in the past.  She did not take her meds so just received metoprolol, gabapentin and will get Bicitra also.     Past Medical History:  H/O: Abdominal pain, unspecified site  H/O: Acquired keratoderma  H/O: Actinic keratosis  H/O: Acute sinusitis, unspecified  H/O: Acute upper respiratory infections of unspecif*  H/O: Arthritis  H/O: Benign neoplasm of colon  H/O: Benign neoplasm of skin, site unspecified  H/O: Cervicalgia  H/O Closed dislocation of metatarsal (bone), joint*  H/O: Cough  H/O: Diffuse cystic mastopathy  H/O Dizziness and giddiness  H/O: Essential hypertension, benign  H/O: Fever, unspecified  H/O: GERD (gastroesophageal reflux disease)  H/O: Headache(784.0)  H/O: Injury, other and unspecified, knee, leg, ankl*  H/O: Mixed hyperlipidemia  H/O: Mononeuritis of unspecified site  H/O: Nonsuppurative otitis media, not specified as *  H/O: Osteoporosis, unspecified  H/O: Other emphysema (H)  H/O: Other musculoskeletal symptoms   H/O Other seborrheic keratosis  H/O: Other specified personal history presenting ha*  H/O: Other specified personal history presenting ha*  H/O: Pain in limb  H/O: Plantar wart  H/O: Postnasal drip  H/O: Radiculopathy of lumbar region  H/O: Tachycardia, unspecified  H/O: Unspecified asthma(493.90)  H/O Unspecified asthma(493.90)  H/O: Unspecified disorder of skin and subcutaneous *  H/O: Unspecified sinusitis (chronic)  H/O Urinary tract infection, site not specified    Past Surgical History:  2/23/2015: OPTICAL TRACKING SYSTEM FUSION POSTERIOR SPINE* N/A      Comment: Procedure: OPTICAL TRACKING SYSTEM  FUSION                SPINE POSTERIOR LUMBAR ONE LEVEL;  Surgeon:                Issa Sapp MD;  Location: [facility] OR  H/O: ORTHOPEDIC SURGERY Left      Comment: Left knee replacement        Cardiovascular Findings   (+) hypertension,   (-) congenital heart disease, dysrhythmias and pacemaker  Comments: EKG end of Nov 2018: LVH with IVCD; NSR.    Stress ECHO in 2011 was not suggestive of ischemia; there was mitral and aortic valve calcification; EF was 50%    Neuro Findings - negative ROS    Pulmonary Findings   (+) asthma  (-) apnea    Asthma  Control: well controlled  Daily inhaler: effective  Comments: H/O COPD and asthma on inhalers    HENT Findings - negative HENT ROS    Skin Findings - negative skin ROS      GI/Hepatic/Renal Findings   (+) GERD    GERD is well controlled    Endocrine/Metabolic Findings - negative ROS      Genetic/Syndrome Findings - negative genetics/syndromes ROS      Additional Notes  Allergies:   -- Hydrocodone     --  Hydrocodon Bitartrate powder   -- Oxycodone Hcl (Oxycodone)     Current Outpatient Prescriptions:      acetaminophen (TYLENOL) 325 MG tablet, Take 2 tablets (650 mg) by mouth every 6 hours as needed for mild pain, Disp: 100 tablet, Rfl:      albuterol (2.5 MG/3ML) 0.083% nebulizer solution, Take 1 vial by nebulization every 6 hours as needed for shortness of breath / dyspnea or wheezing, Disp: , Rfl:      ALENDRONATE SODIUM PO, Take 35 mg by mouth once a week, Disp: , Rfl:      Calcium Carbonate (CALTRATE 600 PO), , Disp: , Rfl:      fluticasone-salmeterol (ADVAIR) 500-50 MCG/DOSE diskus inhaler, Inhale 1 puff into the lungs 2 times daily, Disp: , Rfl:      GABAPENTIN PO, Take 300 mg by mouth 2 times daily, Disp: , Rfl:      METOPROLOL TARTRATE PO, Take 50 mg by mouth 2 times daily , Disp: , Rfl:      miconazole (MICATIN; MICRO GUARD) 2 % powder, Apply topically daily Use until March 5, Disp: , Rfl:      Multiple Vitamin (MULTIVITAMINS PO), Take 1 tablet by mouth Pt takes  "Centrum  silver, Disp: , Rfl:      omeprazole (PRILOSEC) 20 MG capsule, Take 20 mg by mouth daily, Disp: , Rfl:      senna-docusate (SENOKOT-S;PERICOLACE) 8.6-50 MG per tablet, Take 2 tablets by mouth 2 times daily as needed for constipation, Disp: 7 tablet, Rfl:      SIMVASTATIN PO, Take 40 mg by mouth At Bedtime, Disp: , Rfl:      VITAMIN D, CHOLECALCIFEROL, PO, Take 2,000 Units by mouth daily, Disp: , Rfl:      ASPIRIN PO, Take 81 mg by mouth daily, Disp: , Rfl:      traMADol (ULTRAM) 50 MG tablet, Take 0.5-1 tablets (25-50 mg) by mouth every 6 hours as needed for other (pain), Disp: 50 tablet,           PHYSICAL EXAM:   Mental Status/Neuro: A/A/O   Airway: Facies: Feasible  Mallampati: II  Mouth/Opening: Full  TM distance: > 6 cm  Neck ROM: Full   Respiratory: Auscultation: CTAB     Resp. Rate: Normal     Resp. Effort: Normal      CV: Rhythm: Regular  Rate: Age appropriate  Heart: Normal Sounds   Comments:      Dental:  Dental Comments: Has a partial plate; has staining and fillings;     BP (!) 149/92  Pulse 94  Temp 36.4  C (97.5  F) (Oral)  Resp 18  Ht 1.499 m (4' 11\")  Wt 74 kg (163 lb 2.3 oz)  SpO2 95%  BMI 32.95 kg/m2                    Lab Results   Component Value Date    WBC 8.4 02/24/2015    HGB 11.0 (L) 02/25/2015    HCT 35.2 02/24/2015     (L) 02/24/2015     02/25/2015    POTASSIUM 4.9 02/25/2015    CHLORIDE 108 02/25/2015    CO2 22 02/25/2015    BUN 11 02/25/2015    CR 0.88 02/25/2015     (H) 02/25/2015    KIRA 7.7 (L) 02/25/2015         Preop Vitals  BP Readings from Last 3 Encounters:   02/26/15 128/63    Pulse Readings from Last 3 Encounters:   02/24/15 72      Resp Readings from Last 3 Encounters:   02/26/15 16    SpO2 Readings from Last 3 Encounters:   02/26/15 95%      Temp Readings from Last 1 Encounters:   02/26/15 36.7  C (98  F) (Oral)    Ht Readings from Last 1 Encounters:   11/29/18 1.499 m (4' 11\")      Wt Readings from Last 1 Encounters:   11/29/18 76.1 kg (167 " "lb 11.2 oz)    Estimated body mass index is 33.87 kg/(m^2) as calculated from the following:    Height as of 11/29/18: 1.499 m (4' 11\").    Weight as of 11/29/18: 76.1 kg (167 lb 11.2 oz).     LDA:  Peripheral IV 02/23/15 Right Hand (Active)   Number of days:1378          Assessment:   ASA SCORE: 3    NPO Status: > 2 hours since completed Clear Liquids; > 6 hours since completed Solid Foods   Documentation: H&P complete; Preop Testing complete; Consents complete   Proceeding: Proceed without further delay     Plan:   Anes. Type:  General   Pre-Induction: Gabapentin PO   Induction:  Inhalational   Airway: Oral ETT   Access/Monitoring: PIV   Maintenance: Balanced   Emergence: Recovery Site (PACU/ICU)   Logistics: Same Day Surgery     Postop Pain/Sedation Strategy:  Standard-Options: Opioids PRN     PONV Management:  Adult Risk Factors: Female, Non-Smoker, Postop Opioids  Prevention: Ondansetron; Dexamethasone     CONSENT: Direct conversation   Plan and risks discussed with: Patient   Blood Products: Consent Deferred (Minimal Blood Loss)     Comments for Plan/Consent:  Avani requests GA. Procedures and risks including those related to positioning and aging (emergence delirium, POCD, other)  discussed. She understood and consented. Qs answered.                Oz Berry MD  "

## 2018-12-03 NOTE — IP AVS SNAPSHOT
[facility]    [address] [address]    Phone:  [phone]                                       After Visit Summary   12/3/2018    Avani Be    MRN: [id number]           After Visit Summary Signature Page     I have received my discharge instructions, and my questions have been answered. I have discussed any challenges I see with this plan with the nurse or doctor.    ..........................................................................................................................................  Patient/Patient Representative Signature      ..........................................................................................................................................  Patient Representative Print Name and Relationship to Patient    ..................................................               ................................................  Date                                   Time    ..........................................................................................................................................  Reviewed by Signature/Title    ...................................................              ..............................................  Date                                               Time          22EPIC Rev 08/18

## 2018-12-03 NOTE — ANESTHESIA POSTPROCEDURE EVALUATION
Anesthesia POST Procedure Evaluation    Patient: Avani Be   MRN:     [id number] Gender:   female   Age:    86 year old :      1931        Preoperative Diagnosis: Lumbar 2 Compression Fracture    Procedure(s):  Stealth Assisted Lumbar 2 Compression Fracture-Lumbar 2 Kyphoplasty    Postop Comments: none       Anesthesia Type:  General    Reportable Event: NO     PAIN: Uncomplicated   Sign Out status: Comfortable, Well controlled pain     PONV: No PONV   Sign Out status:  No Nausea or Vomiting     Neuro/Psych: Uneventful perioperative course   Sign Out Status: Preoperative baseline; Age appropriate mentation     Airway/Resp.: Uneventful perioperative course   Sign Out Status: Non labored breathing, age appropriate RR; Resp. Status within EXPECTED Parameters     CV: Uneventful perioperative course   Sign Out status: Appropriate BP and perfusion indices; Appropriate HR/Rhythm     Disposition:   Sign Out in:  PACU  Disposition:  Phase II; Home  Recovery Course: Uneventful  Follow-Up: Not required     Comments/Narrative:  Avani is awakening satisfactorily; strong; breathing well; oriented; no problem with vision; good all limb activity; no complaints or complications; comfortable and conversing without difficulty and appropriately.            Last Anesthesia Record Vitals:  CRNA VITALS  12/3/2018 1040 - 12/3/2018 1140      12/3/2018             NIBP: 189/82    Pulse: 66    Resp Rate (observed): 12          Last PACU/Preop Vitals:  Vitals:    18 1140 18 1145 18 1200   BP: 167/77 157/72 150/73   Pulse:      Resp: 19 19 16   Temp:  36.6  C (97.9  F)    SpO2: 96% 95% 97%         Electronically Signed By: Oz Berry MD, December 3, 2018, 12:29 PM

## 2018-12-03 NOTE — BRIEF OP NOTE
Orthopaedic Brief Operative Note 12/3/2018 10:55 AM    Patient: Avani Be  MRN: [id number]       Pre-operative diagnosis: Lumbar 2 Compression Fracture    Post-operative diagnosis: Same   Procedure: Procedure(s):  Stealth Assisted Lumbar 2 Compression Fracture-Lumbar 2 Kyphoplasty      Surgeon:  Co-surgeon:  Resident: Johnathan Llamas MD  N/A  None available   Assistant(s): Alondra Sr PA-C     Anesthesia: General   Estimated blood loss: 5 ml   Total IV fluids: (See anesthesia record)   Total urine output: (See anesthesia record)   Blood transfusion No transfusion was given during surgery   Drains: None   Specimens: None   Implants: None   Grafts: N/A   Findings: See dictated operative report for full details   Complications: None   Disposition: Stable and extubated to PACU     Plan:    Pain: Scheduled Tylenol, oxycodone PRN  Activity: Weight bearing as tolerated. No lifting >10 lbs. No excessive twisting or bending. .  Wound care: Dressing change as needed; may remove dressings in 3-4 days  Diet: ADAT  DVT ppx: Mechanical only  Radiographs: N/A  Disposition: Home today if discharge criteria is met         Alondra Sr PA-C  Pager: [phone]    If no answer or after 4pm, please page the on call ortho resident.

## 2018-12-03 NOTE — DISCHARGE INSTRUCTIONS
Discharge Instructions: Kyphoplasty    Fractures in the bones of the spine (vertebrae) can cause severe back pain and loss of movement. You had a procedure, called kyphoplasty, to cement the fractures in your spine, restore the height of the vertebrae, and help relieve pain. The following are instructions to help you care for your back when you are at home.  Pain    Take the pain medication prescribed by your doctor as directed.     Use an ice pack or cold compress, wrapped in a thin towel to reduce pain and swelling around the incision sites. Apply ice pack for 20 minutes; then remove it for 20 minutes. Repeat as needed.   Dressing    You may remove the dressing(s) in 48 hours after surgery.    Do not shower or soak in a bathtub for 48 hours, until dressings are removed.   Activity    Wear a brace if instructed by your doctor.     For the first 1-2 days after surgery, keep your head elevated when lying down.    Take short walks. Start by walking 5 minutes at a time. Then gradually build up your time and distance.     Do not drive for 2 days after surgery or when taking narcotic pain medication.    Do not lift anything heavier than 10 pounds until seen by your doctor.   Notify your doctor if you have the following:    Increased redness, swelling, drainage, or warmth around the incision sites.     Severe pain at the incision sites.    Weakness, numbness, or tingling in your legs.    Fever above 100.4 degrees or shaking chills.   Call 911 if you have any of the following:    Shortness of breath    Chest pain  Follow-up    Call your doctor s office to schedule a follow-up appointment.    Call your doctor with any questions or concerns.    If after hours, call the hospital  at [phone]. Ask for the orthopedic resident on call.                           REV. 5/12    Same-Day Surgery   Adult Discharge Orders & Instructions     For 24 hours after surgery:  1. Get plenty of rest.  A responsible adult must stay  with you for at least 24 hours after you leave the hospital.   2. Pain medication can slow your reflexes. Do not drive or use heavy equipment.  If you have weakness or tingling, don't drive or use heavy equipment until this feeling goes away.  3. Mixing alcohol and pain medication can cause dizziness and slow your breathing. It can even be fatal. Do not drink alcohol while taking pain medication.  4. Avoid strenuous or risky activities.  Ask for help when climbing stairs.   5. You may feel lightheaded.  If so, sit for a few minutes before standing.  Have someone help you get up.   6. If you have nausea (feel sick to your stomach), drink only clear liquids such as apple juice, ginger ale, broth or 7-Up.  Rest may also help.  Be sure to drink enough fluids.  Move to a regular diet as you feel able. Take pain medications with a small amount of solid food, such as toast or crackers, to avoid nausea.   7. A slight fever is normal. Call the doctor if your fever is over 100 F (37.7 C) (taken under the tongue) or lasts longer than 24 hours.  8. You may have a dry mouth, muscle aches, trouble sleeping or a sore throat.  These symptoms should go away after 24 hours.  9. Do not make important or legal decisions.   Pain Management:      1. Take pain medication (if prescribed) for pain as directed by your physician.        2. WARNING: If the pain medication you have been prescribed contains Tylenol (acetaminophen), DO NOT take additional doses of Tylenol (acetaminophen).     Call your doctor for any of the followin.  Signs of infection (fever, growing tenderness at the surgery site, severe pain, a large amount of drainage or bleeding, foul-smelling drainage, redness, swelling).    2.  It has been over 8 to 10 hours since surgery and you are still not able to urinate (pee).    3.  Headache for over 24 hours.    4.  Numbness, tingling or weakness the day after surgery (if you had spinal anesthesia).  To contact a doctor,  call:      [phone] and ask for the Resident On Call for: ______Orthopedics_____ (answered 24 hours a day)      Emergency Department:  [facility]: [phone]  [facility]: [phone]              Caring for Your Incision    You ll need to care for your incision after surgery and certain medical procedures. To close an incision, your doctor used sutures (stitches), steri-strips, staples, or dermabond. Follow the tips on this sheet to help heal and prevent infection of your incision.   Types of Incision Closure:    Surgical Sutures (stitches) are placed by sewing the edges of an incision together with surgical thread. Sutures are either absorbable or non-absorbable. Absorbable sutures break down in the body over time. Non-absorbable sutures need to be removed.     Steri-strips are made of adhesive (sticky) material to help hold the edges of an incision together. Steri-strips usually fall off by themselves in 7 to 10 days.     Surgical Staples are made or steel or titanium. They are often used to close shallow incisions. They are not used on certain body areas, such as the face and hands. This is because these areas have nerves that are close to the surface. Staples are usually removed within a week.     Dermabond (skin glue) is used to close a cut or small incision. The skin glue is less painful than stitches (sutures). In some cases, a lower layer of skin may be sutured before Dermabond is applied. The skin glue closes the cut/incision within a few minutes. It also provides a water-resistant covering. No bandage is required. Dermabond peels off on its own within 5 to 10 days.  Home Care for Your  Incision:    Keep the incision clean and dry. You should bathe only as directed by the doctor. It is okay to wash around the incision, but don t spray water directly on it.     Check the incision site daily for pain, redness, drainage, swelling, or separation of the incision  edges.     If you have a dressing over the incision, change the dressing as directed by the doctor.    Make sure any clothing that touches the incision is loose fitting. This will prevent rubbing. If the incision is on the head, avoid wearing caps or other head coverings. These may rub against the incision.    Avoid rough play, contact sports, or physical activities. This can put you at risk of opening an incision.     As your incision heals, the skin may appear pink or red. It may also feel slightly bumpy or raised. This is called a healing ridge. Over time, the color should fade and the raised skin will become less noticeable.   Call the doctor right away if you have any of the following:    Increased pain, redness, drainage, swelling or bleeding at the incision site    Numbness, coldness or tingling around the incision site    Fever of 101 F degrees or higher

## 2018-12-07 NOTE — TELEPHONE ENCOUNTER
FUTURE VISIT INFORMATION      FUTURE VISIT INFORMATION:    Date: 12/13    Time: 4:00    Location:   REFERRAL INFORMATION:    Referring provider:  Alondra Sr    Referring providers clinic:  [facility]    Reason for visit/diagnosis: Osteoporotic compression fracture of spine, with routine healing, subsequent encounter                                           ALL RECORDS INTERNAL

## 2018-12-13 ENCOUNTER — OFFICE VISIT (OUTPATIENT)
Dept: ORTHOPEDICS | Facility: CLINIC | Age: 83
End: 2018-12-13
Payer: MEDICARE

## 2018-12-13 ENCOUNTER — PRE VISIT (OUTPATIENT)
Dept: ORTHOPEDICS | Facility: CLINIC | Age: 83
End: 2018-12-13

## 2018-12-13 VITALS — WEIGHT: 163 LBS | BODY MASS INDEX: 32.86 KG/M2 | HEIGHT: 59 IN | RESPIRATION RATE: 16 BRPM

## 2018-12-13 DIAGNOSIS — M81.0 OSTEOPOROSIS, UNSPECIFIED OSTEOPOROSIS TYPE, UNSPECIFIED PATHOLOGICAL FRACTURE PRESENCE: ICD-10-CM

## 2018-12-13 DIAGNOSIS — M81.0 OSTEOPOROSIS, UNSPECIFIED OSTEOPOROSIS TYPE, UNSPECIFIED PATHOLOGICAL FRACTURE PRESENCE: Primary | ICD-10-CM

## 2018-12-13 LAB
CALCIUM SERPL-MCNC: 9.4 MG/DL (ref 8.5–10.1)
PTH-INTACT SERPL-MCNC: 40 PG/ML (ref 18–80)
TSH SERPL DL<=0.005 MIU/L-ACNC: 2.92 MU/L (ref 0.4–4)

## 2018-12-13 PROCEDURE — 83970 ASSAY OF PARATHORMONE: CPT | Performed by: FAMILY MEDICINE

## 2018-12-13 PROCEDURE — 82306 VITAMIN D 25 HYDROXY: CPT | Performed by: FAMILY MEDICINE

## 2018-12-13 ASSESSMENT — MIFFLIN-ST. JEOR: SCORE: 1084.99

## 2018-12-13 NOTE — PROGRESS NOTES
SUBJECTIVE:    Avani Be is a 86 year old female here today to [facility]. Had a previous surgery in her back in 2/2015. Had a fall in the last month and after work up had a repeat back surgery for a compression fracture. She was already being treated on fosamax for bone health.     Fracture at L2 s/p kyphoplasty 2 weeks ago (12/3/2018)    She takes about 3-4 T3 a day for her pain. Does keep it under control.      On fosamax weekly but at times misses this dose.     cc: bone health fu  - Vitamin D Intake/Level: takes 2000 international unit(s) george d for ?year. (?400 in calcium and 1000 in centrum)  - Calcium: 600mg a day from calcium carbonate. She eats cottage cheese and yogurt. 300 calcium  - Hx Stress Fx's: rib fracture left side? This compression fracture on treatment  - Current Meds:  - tob use: hx of pack per day but not for 40 years.   - etoh use: once in a while  - Caffeine Use: not much  - Exercise: not much  - Hx Kidney Stones: no  - Hx of Chemo, Skeletal Radiation, or Hormone Therapy: none  - Referral:Dr. Llamas  - Hx of GERD: been on omeprazole for 1+ year for heartburn  - Prior bisphosphonate: several years  - Labs (Cr, Vit D, Ca, PTH): has had checked in past george d 40 in 1/2012 and PTH 56 in 2009  - Prior DEXA scan: yes one year ago at outside hospital. -2.1 at femoral neck left    2/2015 original fusion  12/2018 with Muriel- L2 kyphoplasty      Past Medical History:   Diagnosis Date     Abdominal pain, unspecified site      Acquired keratoderma      Actinic keratosis      Acute sinusitis, unspecified      Acute upper respiratory infections of unspecified site      Arthritis      Benign neoplasm of colon      Benign neoplasm of skin, site unspecified      Cervicalgia      Closed dislocation of metatarsal (bone), joint unspecified      Cough      Diffuse cystic mastopathy      Dizziness and giddiness      Essential hypertension, benign      Fever, unspecified      GERD (gastroesophageal  reflux disease)      Headache(784.0)      Injury, other and unspecified, knee, leg, ankle, and foot      Mixed hyperlipidemia      Mononeuritis of unspecified site      Nonsuppurative otitis media, not specified as acute or chronic      Osteoporosis, unspecified      Other emphysema (H)      Other musculoskeletal symptoms referable to limbs(729.89)      Other seborrheic keratosis      Other specified personal history presenting hazards to health(V15.89)      Other specified personal history presenting hazards to health(V15.89)      Pain in limb      Plantar wart      Postnasal drip      Radiculopathy of lumbar region      Tachycardia, unspecified      Unspecified asthma(493.90)      Unspecified asthma(493.90)      Unspecified disorder of skin and subcutaneous tissue      Unspecified sinusitis (chronic)      Urinary tract infection, site not specified        Past Surgical History:   Procedure Laterality Date     OPTICAL TRACKING SYSTEM FUSION POSTERIOR SPINE LUMBAR N/A 2/23/2015    Procedure: OPTICAL TRACKING SYSTEM FUSION SPINE POSTERIOR LUMBAR ONE LEVEL;  Surgeon: Issa Sapp MD;  Location:  OR     OPTICAL TRACKING SYSTEM KYPHOPLASTY N/A 12/3/2018    Procedure: Stealth Assisted Lumbar 2 Compression Fracture-Lumbar 2 Kyphoplasty ;  Surgeon: Johnathan Llamas MD;  Location:  OR     ORTHOPEDIC SURGERY Left     Left knee replacement       Current Outpatient Medications   Medication Sig Dispense Refill     acetaminophen (TYLENOL) 325 MG tablet Take 1 tablet (325 mg) by mouth every 6 hours as needed for mild pain 50 tablet 0     acetaminophen-codeine (TYLENOL #3) 300-30 MG tablet Take 1 tablet by mouth every 6 hours as needed for severe pain       albuterol (2.5 MG/3ML) 0.083% nebulizer solution Take 1 vial by nebulization every 4 hours as needed for shortness of breath / dyspnea or wheezing        ALENDRONATE SODIUM PO Take 35 mg by mouth once a week       ASPIRIN PO Take 81 mg by mouth daily       Calcium  Carbonate (CALTRATE 600 PO) Take by mouth daily        fluticasone-salmeterol (ADVAIR) 500-50 MCG/DOSE diskus inhaler Inhale 1 puff into the lungs 2 times daily       GABAPENTIN PO Take 300 mg by mouth daily        lidocaine (LIDODERM) 5 % patch Place 1 patch onto the skin every 24 hours       METOPROLOL TARTRATE PO Take 50 mg by mouth 2 times daily        miconazole (MICATIN; MICRO GUARD) 2 % powder Apply topically daily Use until March 5       Multiple Vitamin (MULTIVITAMINS PO) Take 1 tablet by mouth Pt takes Centrum  silver       omeprazole (PRILOSEC) 20 MG capsule Take 20 mg by mouth daily       oxyCODONE (ROXICODONE) 5 MG tablet Take 1 tablet (5 mg) by mouth every 6 hours as needed for moderate to severe pain 40 tablet 0     senna-docusate (SENOKOT-S/PERICOLACE) 8.6-50 MG tablet Take 1-2 tablets by mouth 2 times daily as needed for constipation (While on oral opioids.) 30 tablet 0     SIMVASTATIN PO Take 40 mg by mouth At Bedtime       traMADol (ULTRAM) 50 MG tablet Take 0.5-1 tablets (25-50 mg) by mouth every 6 hours as needed for other (pain) 50 tablet 0     VITAMIN D, CHOLECALCIFEROL, PO Take 2,000 Units by mouth daily         No family history on file.    Social History     Socioeconomic History     Marital status:      Spouse name: Not on file     Number of children: Not on file     Years of education: Not on file     Highest education level: Not on file   Social Needs     Financial resource strain: Not on file     Food insecurity - worry: Not on file     Food insecurity - inability: Not on file     Transportation needs - medical: Not on file     Transportation needs - non-medical: Not on file   Occupational History     Not on file   Tobacco Use     Smoking status: Former Smoker     Smokeless tobacco: Never Used   Substance and Sexual Activity     Alcohol use: Yes     Drug use: Not on file     Sexual activity: Not on file   Other Topics Concern     Parent/sibling w/ CABG, MI or angioplasty before  "65F 55M? Not Asked   Social History Narrative     Not on file       OBJECTIVE:  Resp 16   Ht 1.499 m (4' 11\")   Wt 73.9 kg (163 lb)   BMI 32.92 kg/m    Pleasant female in no distress, walks with walker. Has antalgic gait. Alert and oriented. Non labored breathing. Mild ttp in back around surgical site.     ASSESSMENT:      ICD-10-CM    1. Osteoporosis, unspecified osteoporosis type, unspecified pathological fracture presence M81.0 Dexa hip/pelvis/spine*     Parathyroid Hormone Intact     Calcium     TSH     Vitamin D Deficiency        PLAN:  Patient has a compression fracture while on fosamax. She is not able to easily remember the medications because it is once a week. She doesn't think she misses any of her daily medicines. Given this she should be assessed for bone density (given fracture since last dxa and question of failed bisphosphonate therapy.    Consider Forteo as the next step in the treatment pathway due to fracturing while on a bisphosphonate.     Patient Instructions   1. You need calcium and george d for your bones    2. I think you're getting enough calcium through your pills but ideally you get more through food. The goal is 1200 at least a day. Probably you are only getting 600  from pills (900mg total.     3. Schedule the new dxa scan at the same place as the last one year ago.      We also discussed weight bearing exercise    Patient seen and discussed with Dr. Cynthia Mccauley.     Bob Peraza MD  Sports Medicine Fellow  "

## 2018-12-13 NOTE — PATIENT INSTRUCTIONS
1. You need calcium and george d for your bones    2. I think you're getting enough calcium through your pills but ideally you get more through food. The goal is 1200 at least a day. Probably you are only getting 600  from pills (900mg total.     3. Schedule the new dxa scan at the same place as the last one year ago.

## 2018-12-13 NOTE — LETTER
12/13/2018      RE: Avani Be  [address]  [facility] [place] [address]       SUBJECTIVE:    Avani Be is a 86 year old female here today to disuss bone health. Had a previous surgery in her back in 2/2015. Had a fall in the last month and after work up had a repeat back surgery for a compression fracture. She was already being treated on fosamax for bone health.     Fracture at L2 s/p kyphoplasty 2 weeks ago (12/3/2018)    She takes about 3-4 T3 a day for her pain. Does keep it under control.      On fosamax weekly but at times misses this dose.     cc: bone health fu  - Vitamin D Intake/Level: takes 2000 international unit(s) george d for ?year. (?400 in calcium and 1000 in centrum)  - Calcium: 600mg a day from calcium carbonate. She eats cottage cheese and yogurt. 300 calcium  - Hx Stress Fx's: rib fracture left side? This compression fracture on treatment  - Current Meds:  - tob use: hx of pack per day but not for 40 years.   - etoh use: once in a while  - Caffeine Use: not much  - Exercise: not much  - Hx Kidney Stones: no  - Hx of Chemo, Skeletal Radiation, or Hormone Therapy: none  - Referral:Dr. Llamas  - Hx of GERD: been on omeprazole for 1+ year for heartburn  - Prior bisphosphonate: several years  - Labs (Cr, Vit D, Ca, PTH): has had checked in past george d 40 in 1/2012 and PTH 56 in 2009  - Prior DEXA scan: yes one year ago at outside hospital. -2.1 at femoral neck left    2/2015 original fusion  12/2018 with Muriel- L2 kyphoplasty      Past Medical History:   Diagnosis Date     Abdominal pain, unspecified site      Acquired keratoderma      Actinic keratosis      Acute sinusitis, unspecified      Acute upper respiratory infections of unspecified site      Arthritis      Benign neoplasm of colon      Benign neoplasm of skin, site unspecified      Cervicalgia      Closed dislocation of metatarsal (bone), joint unspecified      Cough      Diffuse cystic mastopathy      Dizziness and giddiness       Essential hypertension, benign      Fever, unspecified      GERD (gastroesophageal reflux disease)      Headache(784.0)      Injury, other and unspecified, knee, leg, ankle, and foot      Mixed hyperlipidemia      Mononeuritis of unspecified site      Nonsuppurative otitis media, not specified as acute or chronic      Osteoporosis, unspecified      Other emphysema (H)      Other musculoskeletal symptoms referable to limbs(729.89)      Other seborrheic keratosis      Other specified personal history presenting hazards to health(V15.89)      Other specified personal history presenting hazards to health(V15.89)      Pain in limb      Plantar wart      Postnasal drip      Radiculopathy of lumbar region      Tachycardia, unspecified      Unspecified asthma(493.90)      Unspecified asthma(493.90)      Unspecified disorder of skin and subcutaneous tissue      Unspecified sinusitis (chronic)      Urinary tract infection, site not specified        Past Surgical History:   Procedure Laterality Date     OPTICAL TRACKING SYSTEM FUSION POSTERIOR SPINE LUMBAR N/A 2/23/2015    Procedure: OPTICAL TRACKING SYSTEM FUSION SPINE POSTERIOR LUMBAR ONE LEVEL;  Surgeon: Issa Sapp MD;  Location:  OR     OPTICAL TRACKING SYSTEM KYPHOPLASTY N/A 12/3/2018    Procedure: Stealth Assisted Lumbar 2 Compression Fracture-Lumbar 2 Kyphoplasty ;  Surgeon: Johnathan Llamas MD;  Location:  OR     ORTHOPEDIC SURGERY Left     Left knee replacement       Current Outpatient Medications   Medication Sig Dispense Refill     acetaminophen (TYLENOL) 325 MG tablet Take 1 tablet (325 mg) by mouth every 6 hours as needed for mild pain 50 tablet 0     acetaminophen-codeine (TYLENOL #3) 300-30 MG tablet Take 1 tablet by mouth every 6 hours as needed for severe pain       albuterol (2.5 MG/3ML) 0.083% nebulizer solution Take 1 vial by nebulization every 4 hours as needed for shortness of breath / dyspnea or wheezing        ALENDRONATE SODIUM PO Take  35 mg by mouth once a week       ASPIRIN PO Take 81 mg by mouth daily       Calcium Carbonate (CALTRATE 600 PO) Take by mouth daily        fluticasone-salmeterol (ADVAIR) 500-50 MCG/DOSE diskus inhaler Inhale 1 puff into the lungs 2 times daily       GABAPENTIN PO Take 300 mg by mouth daily        lidocaine (LIDODERM) 5 % patch Place 1 patch onto the skin every 24 hours       METOPROLOL TARTRATE PO Take 50 mg by mouth 2 times daily        miconazole (MICATIN; MICRO GUARD) 2 % powder Apply topically daily Use until March 5       Multiple Vitamin (MULTIVITAMINS PO) Take 1 tablet by mouth Pt takes Centrum  silver       omeprazole (PRILOSEC) 20 MG capsule Take 20 mg by mouth daily       oxyCODONE (ROXICODONE) 5 MG tablet Take 1 tablet (5 mg) by mouth every 6 hours as needed for moderate to severe pain 40 tablet 0     senna-docusate (SENOKOT-S/PERICOLACE) 8.6-50 MG tablet Take 1-2 tablets by mouth 2 times daily as needed for constipation (While on oral opioids.) 30 tablet 0     SIMVASTATIN PO Take 40 mg by mouth At Bedtime       traMADol (ULTRAM) 50 MG tablet Take 0.5-1 tablets (25-50 mg) by mouth every 6 hours as needed for other (pain) 50 tablet 0     VITAMIN D, CHOLECALCIFEROL, PO Take 2,000 Units by mouth daily         No family history on file.    Social History     Socioeconomic History     Marital status:      Spouse name: Not on file     Number of children: Not on file     Years of education: Not on file     Highest education level: Not on file   Social Needs     Financial resource strain: Not on file     Food insecurity - worry: Not on file     Food insecurity - inability: Not on file     Transportation needs - medical: Not on file     Transportation needs - non-medical: Not on file   Occupational History     Not on file   Tobacco Use     Smoking status: Former Smoker     Smokeless tobacco: Never Used   Substance and Sexual Activity     Alcohol use: Yes     Drug use: Not on file     Sexual activity: Not  "on file   Other Topics Concern     Parent/sibling w/ CABG, MI or angioplasty before 65F 55M? Not Asked   Social History Narrative     Not on file       OBJECTIVE:  Resp 16   Ht 1.499 m (4' 11\")   Wt 73.9 kg (163 lb)   BMI 32.92 kg/m     Pleasant female in no distress, walks with walker. Has antalgic gait. Alert and oriented. Non labored breathing. Mild ttp in back around surgical site.     ASSESSMENT:      ICD-10-CM    1. Osteoporosis, unspecified osteoporosis type, unspecified pathological fracture presence M81.0 Dexa hip/pelvis/spine*     Parathyroid Hormone Intact     Calcium     TSH     Vitamin D Deficiency        PLAN:  Patient has a compression fracture while on fosamax. She is not able to easily remember the medications because it is once a week. She doesn't think she misses any of her daily medicines. Given this she should be assessed for bone density (given fracture since last dxa and question of failed bisphosphonate therapy.    Consider Forteo as the next step in the treatment pathway due to fracturing while on a bisphosphonate.     Patient Instructions   1. You need calcium and george d for your bones    2. I think you're getting enough calcium through your pills but ideally you get more through food. The goal is 1200 at least a day. Probably you are only getting 600  from pills (900mg total.     3. Schedule the new dxa scan at the same place as the last one year ago.      We also discussed weight bearing exercise    Patient seen and discussed with Dr. Cynthia Mccauley.     Bob Peraza MD  Sports Medicine Fellow    Attending Note:   I have personally examined this patient and have reviewed the clinical presentation and progress note with the fellow. I agree with the treatment plan as outlined. The plan was formulated with the fellow on the day of the patient's visit. I have reviewed all imaging with the fellow and agree with the findings in the documentation.     Cynthia Mccauley MD, CAQ, " CCD  [facility]  Sports Medicine and Bone Health    Bob Peraza MD

## 2018-12-14 LAB — DEPRECATED CALCIDIOL+CALCIFEROL SERPL-MC: 81 UG/L (ref 20–75)

## 2018-12-31 NOTE — PROGRESS NOTES
Attending Note:   I have personally examined this patient and have reviewed the clinical presentation and progress note with the fellow. I agree with the treatment plan as outlined. The plan was formulated with the fellow on the day of the patient's visit. I have reviewed all imaging with the fellow and agree with the findings in the documentation.     Cynthia Mccauley MD, CAQ, CCD  [facility]  Sports Medicine and Bone Health

## 2019-01-17 ENCOUNTER — OFFICE VISIT (OUTPATIENT)
Dept: ORTHOPEDICS | Facility: CLINIC | Age: 84
End: 2019-01-17
Payer: MEDICARE

## 2019-01-17 VITALS — RESPIRATION RATE: 16 BRPM | WEIGHT: 163 LBS | HEIGHT: 59 IN | BODY MASS INDEX: 32.86 KG/M2

## 2019-01-17 DIAGNOSIS — M81.0 OSTEOPOROSIS, UNSPECIFIED OSTEOPOROSIS TYPE, UNSPECIFIED PATHOLOGICAL FRACTURE PRESENCE: Primary | ICD-10-CM

## 2019-01-17 ASSESSMENT — MIFFLIN-ST. JEOR: SCORE: 1079.99

## 2019-01-17 NOTE — LETTER
1/17/2019      RE: Avani Be  [address]  [facility] [place] [address]       SUBJECTIVE:    Avani Be is a 86 year old female here today to [facility] bone health and follow up her labs from last visit. She was on fosamax when she had this compression fracture while on treatment.     Hx: L2 compression fracture s/p kyphoplasty in early 12/2018. She was on fosamax once a week for a few years prior to this. thinks she had left sided rib fractures as well    Takes 2000 of george d daily and 600 of calcium carbonate supplement. Does eat calcium in cottage cheese and yogurt several days a week. Had a previous surgery in her back in 2/2015. Had a fall in the last month and after work up had a repeat back surgery for a compression fracture. She was already being treated on fosamax for bone health.     Surgical hx: hx of previous surgery on back on 2/2015.     - Hx of GERD: been on omeprazole for 1+ year for heartburn    - Prior DEXA scan: yes one year ago at outside hospital. -2.1 at femoral neck left    2/2015 original fusion  12/2018 with Muriel- L2 kyphoplasty      Past Medical History:   Diagnosis Date     Abdominal pain, unspecified site      Acquired keratoderma      Actinic keratosis      Acute sinusitis, unspecified      Acute upper respiratory infections of unspecified site      Arthritis      Benign neoplasm of colon      Benign neoplasm of skin, site unspecified      Cervicalgia      Closed dislocation of metatarsal (bone), joint unspecified      Cough      Diffuse cystic mastopathy      Dizziness and giddiness      Essential hypertension, benign      Fever, unspecified      GERD (gastroesophageal reflux disease)      Headache(784.0)      Injury, other and unspecified, knee, leg, ankle, and foot      Mixed hyperlipidemia      Mononeuritis of unspecified site      Nonsuppurative otitis media, not specified as acute or chronic      Osteoporosis, unspecified      Other emphysema (H)      Other  musculoskeletal symptoms referable to limbs(729.89)      Other seborrheic keratosis      Other specified personal history presenting hazards to health(V15.89)      Other specified personal history presenting hazards to health(V15.89)      Pain in limb      Plantar wart      Postnasal drip      Radiculopathy of lumbar region      Tachycardia, unspecified      Unspecified asthma(493.90)      Unspecified asthma(493.90)      Unspecified disorder of skin and subcutaneous tissue      Unspecified sinusitis (chronic)      Urinary tract infection, site not specified        Past Surgical History:   Procedure Laterality Date     OPTICAL TRACKING SYSTEM FUSION POSTERIOR SPINE LUMBAR N/A 2/23/2015    Procedure: OPTICAL TRACKING SYSTEM FUSION SPINE POSTERIOR LUMBAR ONE LEVEL;  Surgeon: Issa Sapp MD;  Location:  OR     OPTICAL TRACKING SYSTEM KYPHOPLASTY N/A 12/3/2018    Procedure: Stealth Assisted Lumbar 2 Compression Fracture-Lumbar 2 Kyphoplasty ;  Surgeon: Johnathan Llamas MD;  Location:  OR     ORTHOPEDIC SURGERY Left     Left knee replacement       Current Outpatient Medications   Medication Sig Dispense Refill     acetaminophen (TYLENOL) 325 MG tablet Take 1 tablet (325 mg) by mouth every 6 hours as needed for mild pain 50 tablet 0     acetaminophen-codeine (TYLENOL #3) 300-30 MG tablet Take 1 tablet by mouth every 6 hours as needed for severe pain       albuterol (2.5 MG/3ML) 0.083% nebulizer solution Take 1 vial by nebulization every 4 hours as needed for shortness of breath / dyspnea or wheezing        ALENDRONATE SODIUM PO Take 35 mg by mouth once a week       ASPIRIN PO Take 81 mg by mouth daily       Calcium Carbonate (CALTRATE 600 PO) Take by mouth daily        fluticasone-salmeterol (ADVAIR) 500-50 MCG/DOSE diskus inhaler Inhale 1 puff into the lungs 2 times daily       GABAPENTIN PO Take 300 mg by mouth daily        insulin pen needle (32G X 6 MM) 32G X 6 MM miscellaneous Use with forteo needle (change  as appropriate to match forteo pen) 100 each 11     lidocaine (LIDODERM) 5 % patch Place 1 patch onto the skin every 24 hours       METOPROLOL TARTRATE PO Take 50 mg by mouth 2 times daily        miconazole (MICATIN; MICRO GUARD) 2 % powder Apply topically daily Use until March 5       Multiple Vitamin (MULTIVITAMINS PO) Take 1 tablet by mouth Pt takes Centrum  silver       omeprazole (PRILOSEC) 20 MG capsule Take 20 mg by mouth daily       oxyCODONE (ROXICODONE) 5 MG tablet Take 1 tablet (5 mg) by mouth every 6 hours as needed for moderate to severe pain 40 tablet 0     senna-docusate (SENOKOT-S/PERICOLACE) 8.6-50 MG tablet Take 1-2 tablets by mouth 2 times daily as needed for constipation (While on oral opioids.) 30 tablet 0     SIMVASTATIN PO Take 40 mg by mouth At Bedtime       teriparatide, recombinant, (FORTEO) 600 MCG/2.4ML SOLN injection Inject 0.08 mLs (20 mcg) Subcutaneous daily 7.2 mL 3     VITAMIN D, CHOLECALCIFEROL, PO Take 2,000 Units by mouth daily       traMADol (ULTRAM) 50 MG tablet Take 0.5-1 tablets (25-50 mg) by mouth every 6 hours as needed for other (pain) (Patient not taking: Reported on 12/13/2018) 50 tablet 0       No family history on file.    Social History     Socioeconomic History     Marital status:      Spouse name: Not on file     Number of children: Not on file     Years of education: Not on file     Highest education level: Not on file   Social Needs     Financial resource strain: Not on file     Food insecurity - worry: Not on file     Food insecurity - inability: Not on file     Transportation needs - medical: Not on file     Transportation needs - non-medical: Not on file   Occupational History     Not on file   Tobacco Use     Smoking status: Former Smoker     Smokeless tobacco: Never Used   Substance and Sexual Activity     Alcohol use: Yes     Drug use: Not on file     Sexual activity: Not on file   Other Topics Concern     Parent/sibling w/ CABG, MI or angioplasty  "before 65F 55M? Not Asked   Social History Narrative     Not on file       OBJECTIVE:  Resp 16   Ht 1.499 m (4' 11\")   Wt 73.9 kg (163 lb)   BMI 32.92 kg/m     She is here with her daughter today.   Pleasant female in no distress, walks with walker. Has antalgic gait. Alert and oriented. Non labored breathing. Mild ttp in back around surgical site.     ASSESSMENT:      ICD-10-CM    1. Osteoporosis, unspecified osteoporosis type, unspecified pathological fracture presence M81.0 teriparatide, recombinant, (FORTEO) 600 MCG/2.4ML SOLN injection     insulin pen needle (32G X 6 MM) 32G X 6 MM miscellaneous        PLAN:  Given her compression fracture on treatment and her t score of -6.7 in the wrist she has severe osteoporosis. She has heartburn and failed fosamax (bisphosphontate) therapy and her t score is -6.7 given all of this I have recommended that she start forteo as a bone builder to help her increase her bony strength.       Patient Instructions   1. This bone stenght is worse than I hoped    2. We have a good medicine that is injection to try and build some bone back    3. Follow up (and hopefully have medicine) in 2 weeks with Garrick       Patient seen and discussed with Dr. Cynthia Mccauley.     Attending Note:   I have personally examined this patient and have reviewed the clinical presentation and progress note with the fellow. I agree with the treatment plan as outlined. The plan was formulated with the fellow on the day of the patient's visit. I have reviewed all imaging with the fellow and agree with the findings in the documentation.     Cynthia Mccauley MD, CAQ, CCD  [facility]  Sports Medicine and Bone Health    Bob Peraza MD    "

## 2019-01-17 NOTE — PROGRESS NOTES
SUBJECTIVE:    Avani Be is a 86 year old female here today to [facility] bone health and follow up her labs from last visit. She was on fosamax when she had this compression fracture while on treatment.     Hx: L2 compression fracture s/p kyphoplasty in early 12/2018. She was on fosamax once a week for a few years prior to this. thinks she had left sided rib fractures as well    Takes 2000 of george d daily and 600 of calcium carbonate supplement. Does eat calcium in cottage cheese and yogurt several days a week. Had a previous surgery in her back in 2/2015. Had a fall in the last month and after work up had a repeat back surgery for a compression fracture. She was already being treated on fosamax for bone health.     Surgical hx: hx of previous surgery on back on 2/2015.     - Hx of GERD: been on omeprazole for 1+ year for heartburn    - Prior DEXA scan: yes one year ago at outside hospital. -2.1 at femoral neck left    2/2015 original fusion  12/2018 with Muriel- L2 kyphoplasty      Past Medical History:   Diagnosis Date     Abdominal pain, unspecified site      Acquired keratoderma      Actinic keratosis      Acute sinusitis, unspecified      Acute upper respiratory infections of unspecified site      Arthritis      Benign neoplasm of colon      Benign neoplasm of skin, site unspecified      Cervicalgia      Closed dislocation of metatarsal (bone), joint unspecified      Cough      Diffuse cystic mastopathy      Dizziness and giddiness      Essential hypertension, benign      Fever, unspecified      GERD (gastroesophageal reflux disease)      Headache(784.0)      Injury, other and unspecified, knee, leg, ankle, and foot      Mixed hyperlipidemia      Mononeuritis of unspecified site      Nonsuppurative otitis media, not specified as acute or chronic      Osteoporosis, unspecified      Other emphysema (H)      Other musculoskeletal symptoms referable to limbs(469.89)      Other seborrheic keratosis      Other  specified personal history presenting hazards to health(V15.89)      Other specified personal history presenting hazards to health(V15.89)      Pain in limb      Plantar wart      Postnasal drip      Radiculopathy of lumbar region      Tachycardia, unspecified      Unspecified asthma(493.90)      Unspecified asthma(493.90)      Unspecified disorder of skin and subcutaneous tissue      Unspecified sinusitis (chronic)      Urinary tract infection, site not specified        Past Surgical History:   Procedure Laterality Date     OPTICAL TRACKING SYSTEM FUSION POSTERIOR SPINE LUMBAR N/A 2/23/2015    Procedure: OPTICAL TRACKING SYSTEM FUSION SPINE POSTERIOR LUMBAR ONE LEVEL;  Surgeon: Issa Sapp MD;  Location:  OR     OPTICAL TRACKING SYSTEM KYPHOPLASTY N/A 12/3/2018    Procedure: Stealth Assisted Lumbar 2 Compression Fracture-Lumbar 2 Kyphoplasty ;  Surgeon: Johnathan Llamas MD;  Location:  OR     ORTHOPEDIC SURGERY Left     Left knee replacement       Current Outpatient Medications   Medication Sig Dispense Refill     acetaminophen (TYLENOL) 325 MG tablet Take 1 tablet (325 mg) by mouth every 6 hours as needed for mild pain 50 tablet 0     acetaminophen-codeine (TYLENOL #3) 300-30 MG tablet Take 1 tablet by mouth every 6 hours as needed for severe pain       albuterol (2.5 MG/3ML) 0.083% nebulizer solution Take 1 vial by nebulization every 4 hours as needed for shortness of breath / dyspnea or wheezing        ALENDRONATE SODIUM PO Take 35 mg by mouth once a week       ASPIRIN PO Take 81 mg by mouth daily       Calcium Carbonate (CALTRATE 600 PO) Take by mouth daily        fluticasone-salmeterol (ADVAIR) 500-50 MCG/DOSE diskus inhaler Inhale 1 puff into the lungs 2 times daily       GABAPENTIN PO Take 300 mg by mouth daily        insulin pen needle (32G X 6 MM) 32G X 6 MM miscellaneous Use with forteo needle (change as appropriate to match forteo pen) 100 each 11     lidocaine (LIDODERM) 5 % patch Place 1 patch  onto the skin every 24 hours       METOPROLOL TARTRATE PO Take 50 mg by mouth 2 times daily        miconazole (MICATIN; MICRO GUARD) 2 % powder Apply topically daily Use until March 5       Multiple Vitamin (MULTIVITAMINS PO) Take 1 tablet by mouth Pt takes Centrum  silver       omeprazole (PRILOSEC) 20 MG capsule Take 20 mg by mouth daily       oxyCODONE (ROXICODONE) 5 MG tablet Take 1 tablet (5 mg) by mouth every 6 hours as needed for moderate to severe pain 40 tablet 0     senna-docusate (SENOKOT-S/PERICOLACE) 8.6-50 MG tablet Take 1-2 tablets by mouth 2 times daily as needed for constipation (While on oral opioids.) 30 tablet 0     SIMVASTATIN PO Take 40 mg by mouth At Bedtime       teriparatide, recombinant, (FORTEO) 600 MCG/2.4ML SOLN injection Inject 0.08 mLs (20 mcg) Subcutaneous daily 7.2 mL 3     VITAMIN D, CHOLECALCIFEROL, PO Take 2,000 Units by mouth daily       traMADol (ULTRAM) 50 MG tablet Take 0.5-1 tablets (25-50 mg) by mouth every 6 hours as needed for other (pain) (Patient not taking: Reported on 12/13/2018) 50 tablet 0       No family history on file.    Social History     Socioeconomic History     Marital status:      Spouse name: Not on file     Number of children: Not on file     Years of education: Not on file     Highest education level: Not on file   Social Needs     Financial resource strain: Not on file     Food insecurity - worry: Not on file     Food insecurity - inability: Not on file     Transportation needs - medical: Not on file     Transportation needs - non-medical: Not on file   Occupational History     Not on file   Tobacco Use     Smoking status: Former Smoker     Smokeless tobacco: Never Used   Substance and Sexual Activity     Alcohol use: Yes     Drug use: Not on file     Sexual activity: Not on file   Other Topics Concern     Parent/sibling w/ CABG, MI or angioplasty before 65F 55M? Not Asked   Social History Narrative     Not on file       OBJECTIVE:  Resp 16   Ht  "1.499 m (4' 11\")   Wt 73.9 kg (163 lb)   BMI 32.92 kg/m    She is here with her daughter today.   Pleasant female in no distress, walks with walker. Has antalgic gait. Alert and oriented. Non labored breathing. Mild ttp in back around surgical site.     ASSESSMENT:      ICD-10-CM    1. Osteoporosis, unspecified osteoporosis type, unspecified pathological fracture presence M81.0 teriparatide, recombinant, (FORTEO) 600 MCG/2.4ML SOLN injection     insulin pen needle (32G X 6 MM) 32G X 6 MM miscellaneous        PLAN:  Given her compression fracture on treatment and her t score of -6.7 in the wrist she has severe osteoporosis. She has heartburn and failed fosamax (bisphosphontate) therapy and her t score is -6.7 given all of this I have recommended that she start forteo as a bone builder to help her increase her bony strength.       Patient Instructions   1. This bone stenght is worse than I hoped    2. We have a good medicine that is injection to try and build some bone back    3. Follow up (and hopefully have medicine) in 2 weeks with Garrick       Patient seen and discussed with Dr. Cynthia Mccauley.     Bob Peraza MD  Sports Medicine Fellow  "

## 2019-01-17 NOTE — PATIENT INSTRUCTIONS
1. This bone stength is worse than I hoped    2. We have a good medicine that is injection to try and build some bone back    3. Follow up (and hopefully have medicine) in 2 weeks with Garrick

## 2019-01-18 DIAGNOSIS — M80.88XA OSTEOPOROTIC COMPRESSION FRACTURE OF SPINE (H): Primary | ICD-10-CM

## 2019-01-30 NOTE — PROGRESS NOTES
Attending Note:   I have personally examined this patient and have reviewed the clinical presentation and progress note with the fellow. I agree with the treatment plan as outlined. The plan was formulated with the fellow on the day of the patient's visit. I have reviewed all imaging with the fellow and agree with the findings in the documentation.     Cynthia Mccauley MD, CAQ, CCD  [facility]  Sports Medicine and Bone Health

## 2019-02-19 DIAGNOSIS — Z98.1 S/P LUMBAR SPINAL FUSION: Primary | ICD-10-CM

## 2019-10-01 ENCOUNTER — HEALTH MAINTENANCE LETTER (OUTPATIENT)
Age: 84
End: 2019-10-01

## 2019-12-15 ENCOUNTER — HEALTH MAINTENANCE LETTER (OUTPATIENT)
Age: 84
End: 2019-12-15

## 2021-01-15 ENCOUNTER — HEALTH MAINTENANCE LETTER (OUTPATIENT)
Age: 86
End: 2021-01-15

## 2021-09-04 ENCOUNTER — HEALTH MAINTENANCE LETTER (OUTPATIENT)
Age: 86
End: 2021-09-04

## 2022-02-19 ENCOUNTER — HEALTH MAINTENANCE LETTER (OUTPATIENT)
Age: 87
End: 2022-02-19

## 2022-10-16 ENCOUNTER — HEALTH MAINTENANCE LETTER (OUTPATIENT)
Age: 87
End: 2022-10-16

## 2023-04-01 ENCOUNTER — HEALTH MAINTENANCE LETTER (OUTPATIENT)
Age: 88
End: 2023-04-01

## 2024-03-25 NOTE — IP AVS SNAPSHOT
MRN:[id number]                      After Visit Summary   12/3/2018    Avani Be    MRN: [id number]           Thank you!     Thank you for choosing [facility] for your care. Our goal is always to provide you with excellent care. Hearing back from our patients is one way we can continue to improve our services. Please take a few minutes to complete the written survey that you may receive in the mail after you visit with us. Thank you!        Patient Information     Date Of Birth          12/30/1931        About your hospital stay     You were admitted on:  December 3, 2018 You last received care in the:   PACU    You were discharged on:  December 3, 2018       Who to Call     For medical emergencies, please call 911.  For non-urgent questions about your medical care, please call your primary care provider or clinic, [phone]  For questions related to your surgery, please call your surgery clinic        Attending Provider     Provider Johnathan Caraballo MD Orthopedics       Primary Care Provider Office Phone # Fax #    Estevan Valdovinos [phone]      After Care Instructions      Diet as Tolerated       Return to diet before surgery, unless instructed otherwise.            Discharge Instructions       Review outpatient procedure discharge instructions with patient as directed by Provider            Discharge Instructions - Lifting Limit (specify)       Lifting limit  10 pounds until seen at Post-op follow up appointment.            Ice to affected area       Ice pack to surgical site every 15 minutes per hour for 24 hours            No Aspirin, Ibuprofen or Naproxen products       for six weeks post surgery            Notify Provider       For signs and symptoms of infection: Fever greater than 101, redness, swelling, heat at site, drainage, pus.            Remove dressing - at 48 hours           Return to clinic       Return to clinic in 6 weeks             Shower       Cover dressing if dressing is not going to be changed today            Weight bearing - As tolerated           Wound care       Do not immerse wound in water for five weeks                  Your next 10 appointments already scheduled     Dec 13, 2018  4:00 PM CST   (Arrive by 3:45 PM)   New Patient Visit with Bob Peraza MD   [facility] ([facility])    [address]  5th [address]  [place] [address]   [phone]            Jan 23, 2019  9:30 AM CST   (Arrive by 9:00 AM)   RETURN SPINE with Johnathan Llamas MD   [facility] ([facility])    [address]  4th [address]  [place] [facility] [address]   [phone]            Feb 27, 2019 10:45 AM CST   (Arrive by 10:15 AM)   RETURN SPINE with Johnathan Llamas MD   [facility] ([facility])    [address]  [address] [facility] [address]   [phone]              Further instructions from your care team       Discharge Instructions: Kyphoplasty    Fractures in the bones of the spine (vertebrae) can cause severe back pain and loss of movement. You had a procedure, called kyphoplasty, to cement the fractures in your spine, restore the height of the vertebrae, and help relieve pain. The following are instructions to help you care for your back when you are at home.  Pain    Take the pain medication prescribed by your doctor as directed.     Use an ice pack or cold compress, wrapped in a thin towel to reduce pain and swelling around the incision sites. Apply ice pack for 20 minutes; then remove it for 20 minutes. Repeat as needed.   Dressing    You may remove the dressing(s) in 48 hours after surgery.    Do not shower or soak in a bathtub for 48 hours, until dressings are removed.   Activity    Wear a brace if instructed by your doctor.     For the first 1-2 days after surgery, keep your head elevated when  lying down.    Take short walks. Start by walking 5 minutes at a time. Then gradually build up your time and distance.     Do not drive for 2 days after surgery or when taking narcotic pain medication.    Do not lift anything heavier than 10 pounds until seen by your doctor.   Notify your doctor if you have the following:    Increased redness, swelling, drainage, or warmth around the incision sites.     Severe pain at the incision sites.    Weakness, numbness, or tingling in your legs.    Fever above 100.4 degrees or shaking chills.   Call 911 if you have any of the following:    Shortness of breath    Chest pain  Follow-up    Call your doctor s office to schedule a follow-up appointment.    Call your doctor with any questions or concerns.    If after hours, call the hospital  at [phone]. Ask for the orthopedic resident on call.                           REV. 5/12    Same-Day Surgery   Adult Discharge Orders & Instructions     For 24 hours after surgery:  1. Get plenty of rest.  A responsible adult must stay with you for at least 24 hours after you leave the hospital.   2. Pain medication can slow your reflexes. Do not drive or use heavy equipment.  If you have weakness or tingling, don't drive or use heavy equipment until this feeling goes away.  3. Mixing alcohol and pain medication can cause dizziness and slow your breathing. It can even be fatal. Do not drink alcohol while taking pain medication.  4. Avoid strenuous or risky activities.  Ask for help when climbing stairs.   5. You may feel lightheaded.  If so, sit for a few minutes before standing.  Have someone help you get up.   6. If you have nausea (feel sick to your stomach), drink only clear liquids such as apple juice, ginger ale, broth or 7-Up.  Rest may also help.  Be sure to drink enough fluids.  Move to a regular diet as you feel able. Take pain medications with a small amount of solid food, such as toast or crackers, to avoid nausea.    7. A slight fever is normal. Call the doctor if your fever is over 100 F (37.7 C) (taken under the tongue) or lasts longer than 24 hours.  8. You may have a dry mouth, muscle aches, trouble sleeping or a sore throat.  These symptoms should go away after 24 hours.  9. Do not make important or legal decisions.   Pain Management:      1. Take pain medication (if prescribed) for pain as directed by your physician.        2. WARNING: If the pain medication you have been prescribed contains Tylenol (acetaminophen), DO NOT take additional doses of Tylenol (acetaminophen).     Call your doctor for any of the followin.  Signs of infection (fever, growing tenderness at the surgery site, severe pain, a large amount of drainage or bleeding, foul-smelling drainage, redness, swelling).    2.  It has been over 8 to 10 hours since surgery and you are still not able to urinate (pee).    3.  Headache for over 24 hours.    4.  Numbness, tingling or weakness the day after surgery (if you had spinal anesthesia).  To contact a doctor, call:      [phone] and ask for the Resident On Call for: ______Orthopedics_____ (answered 24 hours a day)      Emergency Department:  [facility]: [phone]  [facility]: [phone]              Caring for Your Incision    You ll need to care for your incision after surgery and certain medical procedures. To close an incision, your doctor used sutures (stitches), steri-strips, staples, or dermabond. Follow the tips on this sheet to help heal and prevent infection of your incision.   Types of Incision Closure:    Surgical Sutures (stitches) are placed by sewing the edges of an incision together with surgical thread. Sutures are either absorbable or non-absorbable. Absorbable sutures break down in the body over time. Non-absorbable sutures need to be removed.     Steri-strips are made of adhesive (sticky) material to help hold the edges of an incision  together. Steri-strips usually fall off by themselves in 7 to 10 days.     Surgical Staples are made or steel or titanium. They are often used to close shallow incisions. They are not used on certain body areas, such as the face and hands. This is because these areas have nerves that are close to the surface. Staples are usually removed within a week.     Dermabond (skin glue) is used to close a cut or small incision. The skin glue is less painful than stitches (sutures). In some cases, a lower layer of skin may be sutured before Dermabond is applied. The skin glue closes the cut/incision within a few minutes. It also provides a water-resistant covering. No bandage is required. Dermabond peels off on its own within 5 to 10 days.  Home Care for Your  Incision:    Keep the incision clean and dry. You should bathe only as directed by the doctor. It is okay to wash around the incision, but don t spray water directly on it.     Check the incision site daily for pain, redness, drainage, swelling, or separation of the incision edges.     If you have a dressing over the incision, change the dressing as directed by the doctor.    Make sure any clothing that touches the incision is loose fitting. This will prevent rubbing. If the incision is on the head, avoid wearing caps or other head coverings. These may rub against the incision.    Avoid rough play, contact sports, or physical activities. This can put you at risk of opening an incision.     As your incision heals, the skin may appear pink or red. It may also feel slightly bumpy or raised. This is called a healing ridge. Over time, the color should fade and the raised skin will become less noticeable.   Call the doctor right away if you have any of the following:    Increased pain, redness, drainage, swelling or bleeding at the incision site    Numbness, coldness or tingling around the incision site    Fever of 101 F degrees or higher                Pending Results     No  "orders found from 12/1/2018 to 12/4/2018.            Admission Information     Date & Time Provider Department Dept. Phone    12/3/2018 Johnathan Llamas MD  PACU [phone]      Your Vitals Were     Blood Pressure Pulse Temperature Respirations Height Weight    139/65 94 98.6  F (37  C) (Oral) 16 1.499 m (4' 11\") 74 kg (163 lb 2.3 oz)    Pulse Oximetry BMI (Body Mass Index)                93% 32.95 kg/m2          MyChart Information     [employer] gives you secure access to your electronic health record. If you see a primary care provider, you can also send messages to your care team and make appointments. If you have questions, please call your primary care clinic.  If you do not have a primary care provider, please call [phone] and they will assist you.        Care EveryWhere ID     This is your Care EveryWhere ID. This could be used by other organizations to access your [facility] medical records  [id number]        Equal Access to Services     MANDIE RUIZ AH: Hadii lexy bird hadasho Soomaali, waaxda luqadaha, qaybta kaalmada adeegyada, waxay marie shea . So [facility] [phone].    ATENCIÓN: Si habla español, tiene a ng disposición servicios gratuitos de asistencia lingüística. Llame al [phone].    We comply with applicable federal civil rights laws and [place] laws. We do not discriminate on the basis of race, color, national origin, age, disability, sex, sexual orientation, or gender identity.               Review of your medicines      UNREVIEWED medicines. Ask your doctor about these medicines        Dose / Directions    acetaminophen-codeine 300-30 MG tablet   Commonly known as:  TYLENOL #3        Dose:  1 tablet   Take 1 tablet by mouth every 6 hours as needed for severe pain   Refills:  0       albuterol (2.5 MG/3ML) 0.083% neb solution   Commonly known as:  PROVENTIL   Indication:  inhale 3 milliliter (2.5mg) by neb route 3 times every day as needed        Dose:  1 vial "   Take 1 vial by nebulization every 4 hours as needed for shortness of breath / dyspnea or wheezing   Refills:  0       ALENDRONATE SODIUM PO        Dose:  35 mg   Take 35 mg by mouth once a week   Refills:  0       ASPIRIN PO        Dose:  81 mg   Take 81 mg by mouth daily   Refills:  0       CALTRATE 600 PO        Take by mouth daily   Refills:  0       fluticasone-salmeterol 500-50 MCG/DOSE inhaler   Commonly known as:  ADVAIR        Dose:  1 puff   Inhale 1 puff into the lungs 2 times daily   Refills:  0       GABAPENTIN PO        Dose:  300 mg   Take 300 mg by mouth daily   Refills:  0       lidocaine 5 % patch   Commonly known as:  LIDODERM        Dose:  1 patch   Place 1 patch onto the skin every 24 hours   Refills:  0       METOPROLOL TARTRATE PO        Dose:  50 mg   Take 50 mg by mouth 2 times daily   Refills:  0       miconazole 2 % external powder   Commonly known as:  MICATIN/MICRO GUARD   Used for:  S/P lumbar spinal fusion        Apply topically daily Use until March 5   Refills:  0       MULTIVITAMINS PO        Dose:  1 tablet   Take 1 tablet by mouth Pt takes Centrum  silver   Refills:  0       omeprazole 20 MG DR capsule   Commonly known as:  priLOSEC        Dose:  20 mg   Take 20 mg by mouth daily   Refills:  0       SIMVASTATIN PO        Dose:  40 mg   Take 40 mg by mouth At Bedtime   Refills:  0       traMADol 50 MG tablet   Commonly known as:  ULTRAM   Used for:  S/P lumbar spinal fusion        Dose:  25-50 mg   Take 0.5-1 tablets (25-50 mg) by mouth every 6 hours as needed for other (pain)   Quantity:  50 tablet   Refills:  0       VITAMIN D (CHOLECALCIFEROL) PO        Dose:  2000 Units   Take 2,000 Units by mouth daily   Refills:  0         START taking        Dose / Directions    acetaminophen 325 MG tablet   Commonly known as:  TYLENOL   Used for:  S/P kyphoplasty        Dose:  325 mg   Take 1 tablet (325 mg) by mouth every 6 hours as needed for mild pain   Quantity:  50 tablet   Refills:   "0       oxyCODONE 5 MG tablet   Commonly known as:  ROXICODONE   Used for:  S/P kyphoplasty        Dose:  5 mg   Take 1 tablet (5 mg) by mouth every 6 hours as needed for moderate to severe pain   Quantity:  40 tablet   Refills:  0         CONTINUE these medicines which may have CHANGED, or have new prescriptions. If we are uncertain of the size of tablets/capsules you have at home, strength may be listed as something that might have changed.        Dose / Directions    senna-docusate 8.6-50 MG tablet   Commonly known as:  SENOKOT-S/PERICOLACE   This may have changed:    - how much to take  - reasons to take this   Used for:  S/P kyphoplasty        Dose:  1-2 tablet   Take 1-2 tablets by mouth 2 times daily as needed for constipation (While on oral opioids.)   Quantity:  30 tablet   Refills:  0            Where to get your medicines      These medications were sent to [facility] [place], [place] - [address]  [address] [address]     Phone:  [phone]     acetaminophen 325 MG tablet    senna-docusate 8.6-50 MG tablet         Some of these will need a paper prescription and others can be bought over the counter. Ask your nurse if you have questions.     Bring a paper prescription for each of these medications     oxyCODONE 5 MG tablet                Protect others around you: Learn how to safely use, store and throw away your medicines at www.disposemymeds.org.        Information about your nerve block     Today you received a block to numb the nerves near your surgery site.    This is a block using local anesthetic or \"numbing\" medication injected around the nerves to anesthetize or \"numb\" the area supplied by those nerves. This block is injected into the muscle layer near your surgical site. The type of anesthesia (Exparel) your anesthesia team used to numb your abdomen may give you relief for up to 72 hours.     Diet: There are no diet restrictions, but you should " drink plenty of fluids, unless you are on a fluid-restricted diet.     Activity: If your surgical site is an arm or leg you should be careful with your affected limb, since it is possible to injure your limb without being aware of it due to the numbing. Until full feeling returns, you should guard against bumping or hitting your limb, and avoid extreme hot or cold temperatures on the skin.    Pain Medication: As the block wears off, the feeling will return as a tingling or prickly sensation near your surgical site. You will experience more discomfort from your incisions as the feeling returns. You may want to take a pain pill (a narcotic or Tylenol if this was prescribed by your surgeon) when you start to experience mild pain, before the pain becomes more severe. If your pain medications do not control your pain, you should notify your surgeon. If you are taking narcotics for pain management, do not drink alcohol, drive a car, or perform hazardous activities.  If you have questions or concerns you may call your surgeon at the number provided with your discharge instructions.     Call your surgeon if you experience blurry vision, ringing in the ears or metallic taste in your mouth.         Information about OPIOIDS     PRESCRIPTION OPIOIDS: WHAT YOU NEED TO KNOW   We gave you an opioid (narcotic) pain medicine. It is important to manage your pain, but opioids are not always the best choice. You should first try all the other options your care team gave you. Take this medicine for as short a time (and as few doses) as possible.    Some activities can increase your pain, such as bandage changes or therapy sessions. It may help to take your pain medicine 30 to 60 minutes before these activities. Reduce your stress by getting enough sleep, working on hobbies you enjoy and practicing relaxation or meditation. Talk to your care team about ways to manage your pain beyond prescription opioids.    These medicines have  risks:    DO NOT drive when on new or higher doses of pain medicine. These medicines can affect your alertness and reaction times, and you could be arrested for driving under the influence (DUI). If you need to use opioids long-term, talk to your care team about driving.    DO NOT operate heavy machinery    DO NOT do any other dangerous activities while taking these medicines.    DO NOT drink any alcohol while taking these medicines.     If the opioid prescribed includes acetaminophen, DO NOT take with any other medicines that contain acetaminophen. Read all labels carefully. Look for the word  acetaminophen  or  Tylenol.  Ask your pharmacist if you have questions or are unsure.    You can get addicted to pain medicines, especially if you have a history of addiction (chemical, alcohol or substance dependence). Talk to your care team about ways to reduce this risk.    All opioids tend to cause constipation. Drink plenty of water and eat foods that have a lot of fiber, such as fruits, vegetables, prune juice, apple juice and high-fiber cereal. Take a laxative (Miralax, milk of magnesia, Colace, Senna) if you don t move your bowels at least every other day. Other side effects include upset stomach, sleepiness, dizziness, throwing up, tolerance (needing more of the medicine to have the same effect), physical dependence and slowed breathing.    Store your pills in a secure place, locked if possible. We will not replace any lost or stolen medicine. If you don t finish your medicine, please throw away (dispose) as directed by your pharmacist. The [place] [employer] has more information about safe disposal: https://www.pca.[facility].[place].us/living-green/managing-unwanted-medications             Medication List: This is a list of all your medications and when to take them. Check marks below indicate your daily home schedule. Keep this list as a reference.      Medications           Morning Afternoon Evening Bedtime  As Needed    acetaminophen 325 MG tablet   Commonly known as:  TYLENOL   Take 1 tablet (325 mg) by mouth every 6 hours as needed for mild pain                                acetaminophen-codeine 300-30 MG tablet   Commonly known as:  TYLENOL #3   Take 1 tablet by mouth every 6 hours as needed for severe pain   Last time this was given:  1 tablet on 12/3/2018 12:32 PM                                albuterol (2.5 MG/3ML) 0.083% neb solution   Commonly known as:  PROVENTIL   Take 1 vial by nebulization every 4 hours as needed for shortness of breath / dyspnea or wheezing                                ALENDRONATE SODIUM PO   Take 35 mg by mouth once a week                                ASPIRIN PO   Take 81 mg by mouth daily                                CALTRATE 600 PO   Take by mouth daily                                fluticasone-salmeterol 500-50 MCG/DOSE inhaler   Commonly known as:  ADVAIR   Inhale 1 puff into the lungs 2 times daily                                GABAPENTIN PO   Take 300 mg by mouth daily   Last time this was given:  300 mg on 12/3/2018  8:06 AM                                lidocaine 5 % patch   Commonly known as:  LIDODERM   Place 1 patch onto the skin every 24 hours                                METOPROLOL TARTRATE PO   Take 50 mg by mouth 2 times daily   Last time this was given:  50 mg on 12/3/2018  8:06 AM                                miconazole 2 % external powder   Commonly known as:  MICATIN/MICRO GUARD   Apply topically daily Use until March 5                                MULTIVITAMINS PO   Take 1 tablet by mouth Pt takes Centrum  silver                                omeprazole 20 MG DR capsule   Commonly known as:  priLOSEC   Take 20 mg by mouth daily                                oxyCODONE 5 MG tablet   Commonly known as:  ROXICODONE   Take 1 tablet (5 mg) by mouth every 6 hours as needed for moderate to severe pain                                senna-docusate 8.6-50 MG  tablet   Commonly known as:  SENOKOT-S/PERICOLACE   Take 1-2 tablets by mouth 2 times daily as needed for constipation (While on oral opioids.)                                SIMVASTATIN PO   Take 40 mg by mouth At Bedtime                                traMADol 50 MG tablet   Commonly known as:  ULTRAM   Take 0.5-1 tablets (25-50 mg) by mouth every 6 hours as needed for other (pain)                                VITAMIN D (CHOLECALCIFEROL) PO   Take 2,000 Units by mouth daily                                   [phone]

## (undated) DEVICE — PREP DURAPREP 26ML APL 8630

## (undated) DEVICE — PREP DURAPREP REMOVER 4OZ 8611

## (undated) DEVICE — ESU ELEC BLADE HEX-LOCKING 2.5" E1450X

## (undated) DEVICE — SYR BULB IRRIG 50ML LATEX FREE 0035280

## (undated) DEVICE — POSITIONER ARMBOARD FOAM 1PAIR LF FP-ARMB1

## (undated) DEVICE — SU DERMABOND ADVANCED .7ML DNX12

## (undated) DEVICE — PIN PERCUTANEOUS NAVIGATION FOR SPINE O-ARM 100MM 9733235

## (undated) DEVICE — Device

## (undated) DEVICE — GLOVE PROTEXIS POWDER FREE 8.5 ORTHOPEDIC 2D73ET85

## (undated) DEVICE — DRAPE IOBAN INCISE 23X17" 6650EZ

## (undated) DEVICE — MIXER BONE CEMENT KYPHX A07A

## (undated) DEVICE — MARKER SPHERZ PACK 5

## (undated) DEVICE — SU VICRYL 2-0 CT-2 27" UND J269H

## (undated) DEVICE — GOWN IMPERVIOUS ZONED XLG 9041

## (undated) DEVICE — NDL 18GA 1.5" 305196

## (undated) DEVICE — COVER CAMERA IN-LIGHT DISP LT-C02

## (undated) DEVICE — SYR 50ML LL W/O NDL 309653

## (undated) DEVICE — GLOVE PROTEXIS BLUE W/NEU-THERA 7.0  2D73EB70

## (undated) DEVICE — ESU GROUND PAD UNIVERSAL W/O CORD

## (undated) DEVICE — SOL WATER IRRIG 1000ML BOTTLE 2F7114

## (undated) DEVICE — LINEN BACK PACK 5440

## (undated) DEVICE — DRAPE LAP W/ARMBOARD 29410

## (undated) DEVICE — GLOVE PROTEXIS W/NEU-THERA 7.0  2D73TE70

## (undated) DEVICE — SUCTION FRAZIER 12FR W/HANDLE K73

## (undated) DEVICE — SPONGE LAP 18X18" X8435

## (undated) DEVICE — GLOVE PROTEXIS W/NEU-THERA 8.0  2D73TE80

## (undated) DEVICE — SUCTION MANIFOLD DORNOCH ULTRA CART UL-CL500

## (undated) DEVICE — DRAPE O ARM TUBE 9732722

## (undated) DEVICE — SU MONOCRYL 4-0 PS-2 18" UND Y496G

## (undated) DEVICE — SOL NACL 0.9% IRRIG 1000ML BOTTLE 2F7124

## (undated) DEVICE — BLADE KNIFE SURG 10 371110

## (undated) DEVICE — SPECIMEN CONTAINER 5OZ STERILE 2600SA

## (undated) DEVICE — WIPES FOLEY CARE SURESTEP PROVON DFC100

## (undated) DEVICE — DRSG PRIMAPORE 02X3" 7133

## (undated) DEVICE — DRAPE STERI TOWEL LG 1010

## (undated) RX ORDER — PROPOFOL 10 MG/ML
INJECTION, EMULSION INTRAVENOUS
Status: DISPENSED
Start: 2018-12-03

## (undated) RX ORDER — HYDRALAZINE HYDROCHLORIDE 20 MG/ML
INJECTION INTRAMUSCULAR; INTRAVENOUS
Status: DISPENSED
Start: 2018-12-03

## (undated) RX ORDER — ESMOLOL HYDROCHLORIDE 10 MG/ML
INJECTION INTRAVENOUS
Status: DISPENSED
Start: 2018-12-03

## (undated) RX ORDER — FENTANYL CITRATE 50 UG/ML
INJECTION, SOLUTION INTRAMUSCULAR; INTRAVENOUS
Status: DISPENSED
Start: 2018-12-03

## (undated) RX ORDER — CEFAZOLIN SODIUM 2 G/100ML
INJECTION, SOLUTION INTRAVENOUS
Status: DISPENSED
Start: 2018-12-03

## (undated) RX ORDER — CITRIC ACID/SODIUM CITRATE 334-500MG
SOLUTION, ORAL ORAL
Status: DISPENSED
Start: 2018-12-03

## (undated) RX ORDER — BUPIVACAINE HYDROCHLORIDE AND EPINEPHRINE 2.5; 5 MG/ML; UG/ML
INJECTION, SOLUTION EPIDURAL; INFILTRATION; INTRACAUDAL; PERINEURAL
Status: DISPENSED
Start: 2018-12-03

## (undated) RX ORDER — PHENYLEPHRINE HCL IN 0.9% NACL 1 MG/10 ML
SYRINGE (ML) INTRAVENOUS
Status: DISPENSED
Start: 2018-12-03

## (undated) RX ORDER — METOPROLOL TARTRATE 50 MG
TABLET ORAL
Status: DISPENSED
Start: 2018-12-03

## (undated) RX ORDER — GABAPENTIN 300 MG/1
CAPSULE ORAL
Status: DISPENSED
Start: 2018-12-03